# Patient Record
Sex: MALE | Race: BLACK OR AFRICAN AMERICAN | Employment: FULL TIME | URBAN - METROPOLITAN AREA
[De-identification: names, ages, dates, MRNs, and addresses within clinical notes are randomized per-mention and may not be internally consistent; named-entity substitution may affect disease eponyms.]

---

## 2020-07-01 ENCOUNTER — HOSPITAL ENCOUNTER (OUTPATIENT)
Dept: ULTRASOUND IMAGING | Age: 55
Discharge: HOME | End: 2020-07-01
Attending: INTERNAL MEDICINE
Payer: COMMERCIAL

## 2020-07-01 DIAGNOSIS — R79.89 OTHER SPECIFIED ABNORMAL FINDINGS OF BLOOD CHEMISTRY: ICD-10-CM

## 2020-07-01 PROCEDURE — 76770 US EXAM ABDO BACK WALL COMP: CPT

## 2020-12-15 ENCOUNTER — TRANSCRIBE ORDER (OUTPATIENT)
Dept: SCHEDULING | Age: 55
End: 2020-12-15

## 2020-12-15 DIAGNOSIS — N18.9 CHRONIC KIDNEY DISEASE, UNSPECIFIED: Primary | ICD-10-CM

## 2024-03-19 ENCOUNTER — OFFICE VISIT (OUTPATIENT)
Age: 59
End: 2024-03-19
Payer: COMMERCIAL

## 2024-03-19 VITALS
HEIGHT: 72 IN | OXYGEN SATURATION: 97 % | SYSTOLIC BLOOD PRESSURE: 116 MMHG | DIASTOLIC BLOOD PRESSURE: 70 MMHG | HEART RATE: 43 BPM | BODY MASS INDEX: 26.63 KG/M2 | WEIGHT: 196.6 LBS

## 2024-03-19 DIAGNOSIS — I50.20 HFREF (HEART FAILURE WITH REDUCED EJECTION FRACTION) (HCC): ICD-10-CM

## 2024-03-19 DIAGNOSIS — I42.8 NICM (NONISCHEMIC CARDIOMYOPATHY) (HCC): Primary | ICD-10-CM

## 2024-03-19 DIAGNOSIS — R00.1 CHRONIC SINUS BRADYCARDIA: ICD-10-CM

## 2024-03-19 PROCEDURE — 93000 ELECTROCARDIOGRAM COMPLETE: CPT | Performed by: NURSE PRACTITIONER

## 2024-03-19 PROCEDURE — 99204 OFFICE O/P NEW MOD 45 MIN: CPT | Performed by: NURSE PRACTITIONER

## 2024-03-19 RX ORDER — SACUBITRIL AND VALSARTAN 97; 103 MG/1; MG/1
1 TABLET, FILM COATED ORAL 2 TIMES DAILY
COMMUNITY
Start: 2024-02-21

## 2024-03-19 RX ORDER — ALLOPURINOL 300 MG/1
1 TABLET ORAL DAILY
COMMUNITY
Start: 2023-06-20

## 2024-03-19 RX ORDER — ATENOLOL 25 MG/1
25 TABLET ORAL DAILY
COMMUNITY
End: 2024-03-19

## 2024-03-19 RX ORDER — SPIRONOLACTONE 50 MG/1
50 TABLET, FILM COATED ORAL DAILY
COMMUNITY
Start: 2024-02-07

## 2024-03-19 RX ORDER — AMLODIPINE BESYLATE 2.5 MG/1
2.5 TABLET ORAL DAILY
COMMUNITY
Start: 2024-02-08

## 2024-03-19 RX ORDER — ATORVASTATIN CALCIUM 20 MG/1
1 TABLET, FILM COATED ORAL DAILY
COMMUNITY
Start: 2023-06-20

## 2024-03-19 ASSESSMENT — VISUAL ACUITY: OU: 1

## 2024-03-19 NOTE — PROGRESS NOTES
Subjective:   Jeferson Rosenthal III is a 58 y.o.  male with a past medical history including nonischemic cardiomyopathy, HFrEF (unknown last EF), hypertension, hyperlipidemia, CKDIIIa, and chronic alcohol use who presents today to reestablish care with cardiology.    Patient is a poor historian.  He believes he was diagnosed with a heart condition about 10 years ago but does not seem to know much about the underlying cause.  I can see that he had a heart catheterization back in 2016 that showed no coronary disease with a LVEF of 40%.  He historically is followed up with cardiology at [facility] and chart reviewed indicates that he had recovery of his LVEF documented in December 2022.  He has a history of pretty significant sinus bradycardia that has been deemed to be benign.    From a symptom standpoint, he seems to be doing quite well.  He states that he goes to the gym every day and walks a lot for his occupation without ever having any limiting symptoms.  He states that he occasionally feels a little short of breath and sometimes a little bit dizzy, but the symptoms seem to be fleeting, nonprogressive, and not to the point where it is hindering his daily functioning.    He denies any recent or remote history of syncope/presyncope.    His social history is remarkable for about 35-pack-year smoking, but he tells me that he quit in June 2023.  He consumes anywhere from 24 to 72 ounces of beer daily (less on the weekdays and more on the weekends).    He denies any heart palpitations, chest pain/pressure/tightness with exertion, functional limitations, concerns for side effects of his medical regimen.    Primary Care Physician: Hi Cook MD    Tobacco use: Roughly 35 pack years.  Quit smoking in June 2023  Alcohol use: Consumes 2-3 beers daily, but admits to higher quantities on the weekend  Illicit drug use: None  Occupation: Works at [employer], does security  Pertinent

## 2024-03-26 ENCOUNTER — HOSPITAL ENCOUNTER (OUTPATIENT)
Facility: HOSPITAL | Age: 59
Discharge: HOME | End: 2024-03-28
Payer: COMMERCIAL

## 2024-03-26 DIAGNOSIS — I50.20 HFREF (HEART FAILURE WITH REDUCED EJECTION FRACTION) (HCC): ICD-10-CM

## 2024-03-26 DIAGNOSIS — I42.8 NICM (NONISCHEMIC CARDIOMYOPATHY) (HCC): ICD-10-CM

## 2024-03-26 LAB
ECHO AO ROOT DIAM: 3.1 CM
ECHO AV AREA PLAN: 2.5 CM2
ECHO EST RA PRESSURE: 5 MMHG
ECHO LA DIAMETER: 3.7 CM
ECHO LA TO AORTIC ROOT RATIO: 1.19
ECHO LA VOL A-L A4C: 52 ML (ref 18–58)
ECHO LA VOL MOD A4C: 52 ML (ref 18–58)
ECHO LV EDV A4C: 137 ML
ECHO LV EJECTION FRACTION A4C: 47 %
ECHO LV ESV A4C: 72 ML
ECHO LV FRACTIONAL SHORTENING: 27 % (ref 28–44)
ECHO LV INTERNAL DIMENSION DIASTOLIC: 5.9 CM (ref 4.2–5.9)
ECHO LV INTERNAL DIMENSION SYSTOLIC: 4.3 CM
ECHO LV IVSD: 1.2 CM (ref 0.6–1)
ECHO LV MASS 2D: 271.9 G (ref 88–224)
ECHO LV POSTERIOR WALL DIASTOLIC: 1 CM (ref 0.6–1)
ECHO LV RELATIVE WALL THICKNESS RATIO: 0.34
ECHO LVOT AREA: 4.2 CM2
ECHO LVOT DIAM: 2.3 CM
ECHO RIGHT VENTRICULAR SYSTOLIC PRESSURE (RVSP): 35 MMHG
ECHO TV REGURGITANT MAX VELOCITY: 2.72 M/S
ECHO TV REGURGITANT PEAK GRADIENT: 30 MMHG

## 2024-03-26 PROCEDURE — 93308 TTE F-UP OR LMTD: CPT

## 2024-03-27 ENCOUNTER — TELEPHONE (OUTPATIENT)
Age: 59
End: 2024-03-27

## 2024-03-27 NOTE — TELEPHONE ENCOUNTER
LVM for patient discussing details of normal echocardiogram.  Continue exact same medical regimen as he has been and plan on annual follow-ups unless he has any new concerns.  Advised to call our office back for further discussion if he would like.

## 2024-10-14 ENCOUNTER — ANESTHESIA EVENT (OUTPATIENT)
Facility: HOSPITAL | Age: 59
End: 2024-10-14
Payer: COMMERCIAL

## 2024-10-15 ENCOUNTER — ANESTHESIA (OUTPATIENT)
Facility: HOSPITAL | Age: 59
End: 2024-10-15
Payer: COMMERCIAL

## 2024-10-15 ENCOUNTER — HOSPITAL ENCOUNTER (OUTPATIENT)
Facility: HOSPITAL | Age: 59
Setting detail: OUTPATIENT SURGERY
Discharge: HOME | End: 2024-10-15
Attending: INTERNAL MEDICINE | Admitting: INTERNAL MEDICINE
Payer: COMMERCIAL

## 2024-10-15 VITALS
HEART RATE: 55 BPM | HEIGHT: 72 IN | TEMPERATURE: 98 F | BODY MASS INDEX: 25.73 KG/M2 | DIASTOLIC BLOOD PRESSURE: 91 MMHG | OXYGEN SATURATION: 100 % | WEIGHT: 190 LBS | SYSTOLIC BLOOD PRESSURE: 141 MMHG | RESPIRATION RATE: 17 BRPM

## 2024-10-15 PROCEDURE — 7100000011 HC PHASE II RECOVERY - ADDTL 15 MIN: Performed by: INTERNAL MEDICINE

## 2024-10-15 PROCEDURE — 7100000000 HC PACU RECOVERY - FIRST 15 MIN: Performed by: INTERNAL MEDICINE

## 2024-10-15 PROCEDURE — 7100000010 HC PHASE II RECOVERY - FIRST 15 MIN: Performed by: INTERNAL MEDICINE

## 2024-10-15 PROCEDURE — 3600000012 HC SURGERY LEVEL 2 ADDTL 15MIN: Performed by: INTERNAL MEDICINE

## 2024-10-15 PROCEDURE — 3600000002 HC SURGERY LEVEL 2 BASE: Performed by: INTERNAL MEDICINE

## 2024-10-15 PROCEDURE — 2580000003 HC RX 258: Performed by: ANESTHESIOLOGY

## 2024-10-15 PROCEDURE — 3700000001 HC ADD 15 MINUTES (ANESTHESIA): Performed by: INTERNAL MEDICINE

## 2024-10-15 PROCEDURE — 6360000002 HC RX W HCPCS: Performed by: ANESTHESIOLOGY

## 2024-10-15 PROCEDURE — 3700000000 HC ANESTHESIA ATTENDED CARE: Performed by: INTERNAL MEDICINE

## 2024-10-15 PROCEDURE — 2709999900 HC NON-CHARGEABLE SUPPLY: Performed by: INTERNAL MEDICINE

## 2024-10-15 PROCEDURE — 2500000003 HC RX 250 WO HCPCS: Performed by: ANESTHESIOLOGY

## 2024-10-15 RX ORDER — GLYCOPYRROLATE 0.2 MG/ML
INJECTION INTRAMUSCULAR; INTRAVENOUS
Status: DISCONTINUED | OUTPATIENT
Start: 2024-10-15 | End: 2024-10-15 | Stop reason: SDUPTHER

## 2024-10-15 RX ORDER — HYDRALAZINE HYDROCHLORIDE 20 MG/ML
10 INJECTION INTRAMUSCULAR; INTRAVENOUS
Status: DISCONTINUED | OUTPATIENT
Start: 2024-10-15 | End: 2024-10-15 | Stop reason: HOSPADM

## 2024-10-15 RX ORDER — SODIUM CHLORIDE 0.9 % (FLUSH) 0.9 %
5-40 SYRINGE (ML) INJECTION PRN
Status: DISCONTINUED | OUTPATIENT
Start: 2024-10-15 | End: 2024-10-15 | Stop reason: HOSPADM

## 2024-10-15 RX ORDER — SODIUM CHLORIDE 0.9 % (FLUSH) 0.9 %
5-40 SYRINGE (ML) INJECTION EVERY 12 HOURS SCHEDULED
Status: DISCONTINUED | OUTPATIENT
Start: 2024-10-15 | End: 2024-10-15 | Stop reason: HOSPADM

## 2024-10-15 RX ORDER — SODIUM CHLORIDE 9 MG/ML
INJECTION, SOLUTION INTRAVENOUS PRN
Status: DISCONTINUED | OUTPATIENT
Start: 2024-10-15 | End: 2024-10-15 | Stop reason: HOSPADM

## 2024-10-15 RX ORDER — EPHEDRINE SULFATE/0.9% NACL/PF 50 MG/5 ML
SYRINGE (ML) INTRAVENOUS
Status: DISCONTINUED | OUTPATIENT
Start: 2024-10-15 | End: 2024-10-15 | Stop reason: SDUPTHER

## 2024-10-15 RX ORDER — SODIUM CHLORIDE, SODIUM LACTATE, POTASSIUM CHLORIDE, CALCIUM CHLORIDE 600; 310; 30; 20 MG/100ML; MG/100ML; MG/100ML; MG/100ML
INJECTION, SOLUTION INTRAVENOUS CONTINUOUS
Status: DISCONTINUED | OUTPATIENT
Start: 2024-10-15 | End: 2024-10-15 | Stop reason: HOSPADM

## 2024-10-15 RX ORDER — FENTANYL CITRATE 50 UG/ML
25 INJECTION, SOLUTION INTRAMUSCULAR; INTRAVENOUS EVERY 5 MIN PRN
Status: DISCONTINUED | OUTPATIENT
Start: 2024-10-15 | End: 2024-10-15 | Stop reason: HOSPADM

## 2024-10-15 RX ORDER — LIDOCAINE HYDROCHLORIDE 20 MG/ML
INJECTION, SOLUTION EPIDURAL; INFILTRATION; INTRACAUDAL; PERINEURAL
Status: DISCONTINUED | OUTPATIENT
Start: 2024-10-15 | End: 2024-10-15 | Stop reason: SDUPTHER

## 2024-10-15 RX ORDER — ONDANSETRON 2 MG/ML
4 INJECTION INTRAMUSCULAR; INTRAVENOUS
Status: DISCONTINUED | OUTPATIENT
Start: 2024-10-15 | End: 2024-10-15 | Stop reason: HOSPADM

## 2024-10-15 RX ADMIN — PROPOFOL 25 MG: 10 INJECTION, EMULSION INTRAVENOUS at 11:29

## 2024-10-15 RX ADMIN — PROPOFOL 25 MG: 10 INJECTION, EMULSION INTRAVENOUS at 11:18

## 2024-10-15 RX ADMIN — PROPOFOL 25 MG: 10 INJECTION, EMULSION INTRAVENOUS at 11:30

## 2024-10-15 RX ADMIN — SODIUM CHLORIDE, PRESERVATIVE FREE 80 ML: 5 INJECTION INTRAVENOUS at 11:39

## 2024-10-15 RX ADMIN — PROPOFOL 100 MG: 10 INJECTION, EMULSION INTRAVENOUS at 11:12

## 2024-10-15 RX ADMIN — GLYCOPYRROLATE 0.2 MG: 0.2 INJECTION INTRAMUSCULAR; INTRAVENOUS at 11:14

## 2024-10-15 RX ADMIN — LIDOCAINE HYDROCHLORIDE 60 MG: 20 INJECTION, SOLUTION EPIDURAL; INFILTRATION; INTRACAUDAL; PERINEURAL at 11:12

## 2024-10-15 RX ADMIN — Medication 10 MG: at 11:21

## 2024-10-15 RX ADMIN — PROPOFOL 50 MG: 10 INJECTION, EMULSION INTRAVENOUS at 11:24

## 2024-10-15 ASSESSMENT — PAIN - FUNCTIONAL ASSESSMENT
PAIN_FUNCTIONAL_ASSESSMENT: ADULT NONVERBAL PAIN SCALE (NPVS)
PAIN_FUNCTIONAL_ASSESSMENT: 0-10
PAIN_FUNCTIONAL_ASSESSMENT: 0-10
PAIN_FUNCTIONAL_ASSESSMENT: ADULT NONVERBAL PAIN SCALE (NPVS)
PAIN_FUNCTIONAL_ASSESSMENT: 0-10
PAIN_FUNCTIONAL_ASSESSMENT: NONE - DENIES PAIN
PAIN_FUNCTIONAL_ASSESSMENT: 0-10

## 2024-10-15 NOTE — H&P
G I Procedure Note           Endoscopy History and Physical           Dr. Lukas Macario      [place] Office                Jeferson Rosenthal III [id number]  [id number]    1965  58 y.o.  male      Date of Procedure:   Preoperative Diagnosis:       Procedure:   [unfilled]      Screen for colon cancer [Z12.11]                         Procedure(s):  COLONOSCOPY      Gastroenterologist:  Anesthesia:           Lukas Macario MD                               Monitor Anesthesia Care            History and procedure indication:  Jfeerson Rosenthal III is a 58 y.o. Black /  male who presents with: Screen for colon cancer [Z12.11]   including the additional history of Screening ,Screening for colon cancer,,        Past Medical History:   Diagnosis Date    Chronic alcohol use     Chronic sinus bradycardia     HFrEF (heart failure with reduced ejection fraction) (HCC)     Hyperlipidemia     NICM (nonischemic cardiomyopathy) (HCC)       Prior to Admission medications    Medication Sig Start Date End Date Taking? Authorizing Provider   amLODIPine (NORVASC) 2.5 MG tablet Take 1 tablet by mouth daily 24   Lian Goodwin MD   atorvastatin (LIPITOR) 20 MG tablet Take 1 tablet by mouth daily 23   Lian Goodwin MD   sacubitril-valsartan (ENTRESTO)  MG per tablet Take 1 tablet by mouth 2 times daily 24   Lian Goodwin MD   spironolactone (ALDACTONE) 50 MG tablet Take 1 tablet by mouth daily 24   Lian Goodwin MD   allopurinol (ZYLOPRIM) 300 MG tablet Take 1 tablet by mouth daily 23   Lian Goodwin MD     No Known Allergies    No past surgical history on file.  No family history on file.   Social History     Tobacco Use    Smoking status: Former     Types: Cigarettes     Start date: 2023     Quit date: 3/1/1988     Years since quittin.6    Smokeless tobacco: Never   Substance Use

## 2024-10-15 NOTE — ANESTHESIA POSTPROCEDURE EVALUATION
Post-Anesthesia Evaluation and Assessment    Patient: Jeferson Rosenthal III MRN: [id number]  SSN: [id number]    YOB: 1965  Age: 58 y.o.  Sex: male      I have evaluated the patient and they are stable and ready for discharge from the PACU.     Cardiovascular Function/Vital Signs  Visit Vitals  /86   Pulse 66   Temp 97.7 °F (36.5 °C) (Temporal)   Resp 16   Ht 1.829 m (6')   Wt 86.2 kg (190 lb)   SpO2 97%   BMI 25.77 kg/m²       Patient is status post Monitor Anesthesia Care anesthesia for Procedure(s):  COLONOSCOPY.    Nausea/Vomiting: None    Postoperative hydration reviewed and adequate.    Pain:      Managed    Neurological Status:       At baseline    Mental Status, Level of Consciousness: Alert and  oriented to person, place, and time    Pulmonary Status:       Adequate oxygenation and airway patent    Complications related to anesthesia: None    Post-anesthesia assessment completed. No concerns    Signed By: Ashley Lloyd MD     October 15, 2024            Department of Anesthesiology  Postprocedure Note    Patient: Jeferson Rosenthal III  MRN: [id number]  YOB: 1965  Date of evaluation: 10/15/2024    Procedure Summary       Date: 10/15/24 Room / Location: [facility] MAIN OR R4 / [facility] MAIN OR    Anesthesia Start: 1107 Anesthesia Stop: 1139    Procedure: COLONOSCOPY (Lower GI Region) Diagnosis:       Screen for colon cancer      (Screen for colon cancer [Z12.11])    Surgeons: Lukas Macario MD Responsible Provider: Ashley Lloyd MD    Anesthesia Type: MAC ASA Status: 3            Anesthesia Type: No value filed.    Earnestine Phase I: Earnestine Score: 6    Earnestine Phase II:      Anesthesia Post Evaluation    No notable events documented.

## 2024-10-15 NOTE — PERIOP NOTE
Jeferson Rosenthal III  1965  [id number]    Situation:  Verbal report given from: Helen Shaw RN  Procedure: Procedure(s):  COLONOSCOPY    Background:    Preoperative diagnosis: Screen for colon cancer [Z12.11]    Postoperative diagnosis: * No post-op diagnosis entered *    :  Dr. Macario    Assistant(s): Circulator: Helen Shaw, RN  Scrub Person First: Beka Alexander RN  Circulator Assist: Ally Beckman RN    Specimens: * No specimens in log *    Assessment:  Intra-procedure medications         Anesthesia gave intra-procedure sedation and medications, see anesthesia flow sheet     Intravenous fluids: LR@ KVO     Vital signs stable

## 2024-10-15 NOTE — DISCHARGE INSTRUCTIONS
Endoscopy Discharge Instructions     Dr. Lukas Macario     [place] office                                            NAME: Jeferson Rosenthal III RECORD NUMBER:[id number]    AGE:  58 y.o. YOB: 1965                                                              FINAL Discharge Procedure and Diagnosis:       Procedure(s):  COLONOSCOPY       FINDINGS:     hemorrhoids                                        MEDICATIONS    [x] CONTINUE CURRENT MEDICATIONS     [] NEW MEDICATIONS           1.    2.    3.         Testing   Schedule              Colonoscopy Screening                                   Recommendations       []  Repeat colonoscopy in 6-12 month         2nd to Inadequate  prep    []  Repeat colonoscopy in 3 years    []  Repeat colonoscopy in 5 years    [x]  Repeat colonoscopy in 10 years         New additional  Tests  Call the office   [phone] for the appointment time      []      []      []                                     YOUR NEXT APPOINTMENT WITH DR MACARIO:                                                                                                                                [x]   None follow up with pcp   []  1 week       []   2 week    []  1 month    Always keep KEEP  APPOINTMENT WITH  @PCP@ for regular medical follow up                                                                                                                         If you had a colonoscopy the \"C\" indicates specific instructions        x                                           Diet Instructions :   Ordinarily you may resume your previous diet but your initial diet should be       Light your discharge nurse will go over this with you.  Large meals can cause  abdominal discomfort after these procedures.                                                                           Specific Diet Recommendations:        [x] High fiber diet.

## 2024-10-15 NOTE — OP NOTE
G I Procedure Note            COLONOSCOPY   Dr. Lukas Macario   [place] office     Jeferson Rosenthal III                                   [id number]                                  [id number]   1965                                      58 y.o.                                    male      Procedure Date: @date@   [x]  Anesthesia MAC                                                                                                Pre Op Diagnosis:    Indications:                   1. Screen for colon cancer [Z12.11]                                                                                                                                                                          Post Op Diagnosis:                    1.  Grade one internal hemorrhoids                                                                               H&p completed: Yes      Anesthesia Assessment: Performed prior to procedure:      No change  Anesthesia Plan: Performed prior to procedure:                   No change       Medications: See Reviewed List and Reconcilation           Informed consent was obtained     Risk Statement:  Prior to the procedure the risks were explained to the patient and/or to the family including but not limited to perforation, bleeding, adverse drug reaction, aspiration, and even the need for possible surgery.  A colonoscopy exam is not 100% accurate which may be related to preparation or blind spots during the exam.The possibility that an abnormality and /or cancer could be missed was also discussed as well as alternative x-ray options.         Instrument:    Olympus adult Videocolonoscope                                   Immediate Procedure Reassessment Completed     With the patient in the left lateral position, a rectal examination was performed and the findings were: negative without mass, lesions or tenderness   The Olympus

## 2024-10-15 NOTE — ANESTHESIA PRE PROCEDURE
Department of Anesthesiology  Preprocedure Note       Name:  Jeferson Rosenthal III   Age:  58 y.o.  :  1965                                          MRN:  [id number]         Date:  10/15/2024      Surgeon: Surgeon(s):  Lukas Macario MD    Procedure: Procedure(s):  COLONOSCOPY    Medications prior to admission:   Prior to Admission medications    Medication Sig Start Date End Date Taking? Authorizing Provider   amLODIPine (NORVASC) 2.5 MG tablet Take 1 tablet by mouth daily 24  Yes Provider, MD Lian   atorvastatin (LIPITOR) 20 MG tablet Take 1 tablet by mouth daily 23  Yes Provider, MD Lian   sacubitril-valsartan (ENTRESTO)  MG per tablet Take 1 tablet by mouth 2 times daily 24  Yes ProviderLian MD   spironolactone (ALDACTONE) 50 MG tablet Take 1 tablet by mouth daily 24  Yes Lian Goodwin MD   allopurinol (ZYLOPRIM) 300 MG tablet Take 1 tablet by mouth daily 23  Yes Provider, MD Lian       Current medications:    Current Facility-Administered Medications   Medication Dose Route Frequency Provider Last Rate Last Admin   • sodium chloride flush 0.9 % injection 5-40 mL  5-40 mL IntraVENous 2 times per day Ashley Lloyd MD       • sodium chloride flush 0.9 % injection 5-40 mL  5-40 mL IntraVENous PRN Ashley Lloyd MD       • 0.9 % sodium chloride infusion   IntraVENous PRN Ashley Lloyd MD       • fentaNYL (SUBLIMAZE) injection 25 mcg  25 mcg IntraVENous Q5 Min PRN Ashley Lloyd MD       • ondansetron (ZOFRAN) injection 4 mg  4 mg IntraVENous Once PRN Ashley Lloyd MD       • hydrALAZINE (APRESOLINE) injection 10 mg  10 mg IntraVENous Q15 Min PRN Ashley Lloyd MD       • lactated ringers IV soln infusion   IntraVENous Continuous Ashley Lloyd MD       • sodium chloride flush 0.9 % injection 5-40 mL  5-40 mL IntraVENous 2 times per day Ashley Lloyd MD       • sodium chloride flush 0.9 % injection 5-40 mL  5-40 mL

## 2025-02-27 ENCOUNTER — TELEPHONE (OUTPATIENT)
Age: 60
End: 2025-02-27

## 2025-02-27 RX ORDER — VALSARTAN 320 MG/1
320 TABLET ORAL DAILY
Qty: 30 TABLET | Refills: 3 | Status: SHIPPED | OUTPATIENT
Start: 2025-02-27

## 2025-02-27 NOTE — TELEPHONE ENCOUNTER
Per Call center-Pcp has theory that entresto is possibly causing allergic response and she doesn't want to stop it but she has some recommendations on how to manage the reaction      [phone] best contact for the doctor     And patient number is [phone]      Message was routed to Caio Padron NP.    Caio spoke with patient and discontinued his Entresto as he states it could be Angioedema.  Pt started on Valsartan 320 mg 1 by mouth daily (RX sent to pharmacy)  Spoke with Dr. Hi Cook to relay this message.  She will add the medication to her patient chart.

## 2025-02-27 NOTE — TELEPHONE ENCOUNTER
Pcp has theory that entresto is possibly causing allergic response and she doesn't want to stop it but she has some recommendations on how to manage the reaction     [phone] best contact for the doctor    And patient number is [phone]

## 2025-02-27 NOTE — PROGRESS NOTES
Received call from patient's primary care physician regarding concerns of Entresto possibly inducing intermittent angioedema.  Patient has apparently had unprovoked painless mild lip swelling frequently as of recently.  He is on  of Entresto.  No history of possible angioedema before being on this medication.    We will stop the Entresto right away and start valsartan monotherapy to see if this improves the symptoms.  If not, he will need to come off of ARB altogether.  We have outpatient follow-up in just a few weeks so we can address the topic more at that time.    He does not have any additional allergic reaction like symptoms.    He has never had any severe facial swelling, tongue swelling, difficulty swallowing, or problems with his breathing.  He was advised to call 911 if any of the symptoms occur.

## 2025-03-18 ENCOUNTER — OFFICE VISIT (OUTPATIENT)
Age: 60
End: 2025-03-18
Payer: COMMERCIAL

## 2025-03-18 VITALS
HEIGHT: 72 IN | BODY MASS INDEX: 28.09 KG/M2 | DIASTOLIC BLOOD PRESSURE: 68 MMHG | SYSTOLIC BLOOD PRESSURE: 111 MMHG | HEART RATE: 43 BPM | OXYGEN SATURATION: 99 % | WEIGHT: 207.4 LBS

## 2025-03-18 DIAGNOSIS — R00.1 CHRONIC SINUS BRADYCARDIA: ICD-10-CM

## 2025-03-18 DIAGNOSIS — I42.8 NICM (NONISCHEMIC CARDIOMYOPATHY) (HCC): Primary | ICD-10-CM

## 2025-03-18 DIAGNOSIS — I50.20 HFREF (HEART FAILURE WITH REDUCED EJECTION FRACTION) (HCC): ICD-10-CM

## 2025-03-18 DIAGNOSIS — I95.1 ORTHOSTATIC HYPOTENSION: ICD-10-CM

## 2025-03-18 DIAGNOSIS — N18.31 CKD STAGE 3A, GFR 45-59 ML/MIN (HCC): ICD-10-CM

## 2025-03-18 PROCEDURE — 93000 ELECTROCARDIOGRAM COMPLETE: CPT | Performed by: NURSE PRACTITIONER

## 2025-03-18 PROCEDURE — 99214 OFFICE O/P EST MOD 30 MIN: CPT | Performed by: NURSE PRACTITIONER

## 2025-03-18 RX ORDER — EPINEPHRINE 0.15 MG/.3ML
INJECTION INTRAMUSCULAR
COMMUNITY
Start: 2025-01-09

## 2025-03-18 ASSESSMENT — VISUAL ACUITY: OU: 1

## 2025-03-18 NOTE — PROGRESS NOTES
1. Have you been to the ER, urgent care clinic since your last visit?  Hospitalized since your last visit?No    2. Have you seen or consulted any other health care providers outside of the [facility] since your last visit?  Include any pap smears or colon screening. No

## 2025-03-18 NOTE — PATIENT INSTRUCTIONS
Please stop taking the amlodipine.  This medication could be making your blood pressure little bit low which can make you feel dizzy.    We will do a 1 week monitor of heart.  I would like you to keep a record on the monitor with the device we give you if you feel lightheaded/dizzy, short of breath, or weak and I can look at your heart monitor during those episodes to see if there are any problems.    Try to have 2 or less beers per day.

## 2025-03-18 NOTE — PROGRESS NOTES
Subjective:   Jeferson Rosenthal III is a 59 y.o.  male with a past medical history including nonischemic cardiomyopathy, HFrEF (unknown stacey EF), hypertension, hyperlipidemia, CKDIIIa, ? developmental delay  and chronic alcohol use who presents today for annual follow-up visit.    Mr. Rosenthal presents today with a well-controlled blood pressure and stable weight.  I previously have a conversation with him about some of his intermittent lip swelling that was detected by his primary care provider we decided it would be best to stop his Entresto for concerns of possible angioedema.  He was transition to valsartan and has been tolerating that well.  He is still having intermittent episodes of poorly articulated upper lip swelling and occasionally a sensation of left-sided facial swelling.  He showed me a picture of his face during this episode and he did not have any appreciable lip swelling consistent with angioedema.  He denies any tongue swelling or issues with occultly breathing.  He states that this has been happening \"for a long time\".      His only other concern seem to be intermittent episodes of some mild lightheadedness/dizziness and mild dyspnea.  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  He states that sometimes he feels like he needs to grab onto things to keep himself balanced.  The symptoms usually go away relatively quickly.  He has never syncopized and denies any chest discomfort or diaphoresis during the episodes.    Orthostatics completed in the office today do show a drop of systolic blood pressure from 143-109 with standing without any significant changes in heart rate.  He did not feel dizzy during this episode    In the office today he has ongoing chronic bradycardia.  He previously had workup with this with a brief Holter monitor that did not show any significant issues per his prior cardiologist interpretation.  I cannot review the raw data of

## 2025-03-19 ENCOUNTER — TELEPHONE (OUTPATIENT)
Age: 60
End: 2025-03-19

## 2025-03-19 NOTE — TELEPHONE ENCOUNTER
Spoke with pt after 2 ID.    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    Message to reschedule sent to Jennifer VALENCIA.

## 2025-03-24 ENCOUNTER — HOSPITAL ENCOUNTER (OUTPATIENT)
Facility: HOSPITAL | Age: 60
Discharge: HOME | End: 2025-03-26
Payer: COMMERCIAL

## 2025-03-24 DIAGNOSIS — I42.8 NICM (NONISCHEMIC CARDIOMYOPATHY) (HCC): ICD-10-CM

## 2025-03-24 DIAGNOSIS — I50.20 HFREF (HEART FAILURE WITH REDUCED EJECTION FRACTION) (HCC): ICD-10-CM

## 2025-03-24 PROCEDURE — 93270 REMOTE 30 DAY ECG REV/REPORT: CPT

## 2025-04-02 ENCOUNTER — TELEPHONE (OUTPATIENT)
Age: 60
End: 2025-04-02

## 2025-04-02 NOTE — TELEPHONE ENCOUNTER
Per Preeti Rivero RN in Stress lab    \" I received an urgent alert from [employer] for Mr. Jeferson Rosenthalestela had ordered a monitor for him. His HR is 33. I notified Dr. carrasquillo and uploaded the strip into his chart and he said to notify you and have you call the patient. \"    Spoke with pt after 2 identifiers who stated he is feeling fine,he only feels lightheaded for a few seconds after working out in the mornings.  No c/o dizziness,chest pain,sob or feeling any different than usual.  Reminded if he starts to have any s/s ie chest pain,sob or feeling like he is going to pass out to go to ER.    Pt verbalized understanding with no questions.  
Slow heart rate was sinus harsh, 2 am so he was probably sleeping. Continue to monitor for symptoms and wear monitor until time period is over. thanks  
[FreeTextEntry1] : continues to have ear pain\par TM red b/l\par will start augmentin\par continue with drops\par recommend starting allegra D\par continue to clear sinuses \par RTO 1 week\par \par

## 2025-04-16 ENCOUNTER — RESULTS FOLLOW-UP (OUTPATIENT)
Age: 60
End: 2025-04-16

## 2025-04-16 NOTE — TELEPHONE ENCOUNTER
Left voice message regarding results of recent event monitor and requested callback for further discussion.    He has chronic sinus bradycardia and had episodes in the upper 30s to low 40s during his monitoring.  He has been having some poorly articulated episodes of dizziness but was also found to be orthostatic during his recent follow-up visit, so I reduced his antihypertensive regimen.    I have a lower suspicion for sick sinus syndrome/chronotropic incompetence, but if his dizziness persists we will need to look into this further.

## 2025-04-28 ENCOUNTER — HOSPITAL ENCOUNTER (EMERGENCY)
Facility: HOSPITAL | Age: 60
Discharge: ANOTHER HOSPITAL | End: 2025-04-28
Attending: STUDENT IN AN ORGANIZED HEALTH CARE EDUCATION/TRAINING PROGRAM
Payer: COMMERCIAL

## 2025-04-28 ENCOUNTER — APPOINTMENT (OUTPATIENT)
Facility: HOSPITAL | Age: 60
End: 2025-04-28
Payer: COMMERCIAL

## 2025-04-28 ENCOUNTER — HOSPITAL ENCOUNTER (INPATIENT)
Facility: HOSPITAL | Age: 60
LOS: 2 days | Discharge: HOME | DRG: 281 | End: 2025-04-30
Attending: FAMILY MEDICINE | Admitting: FAMILY MEDICINE
Payer: COMMERCIAL

## 2025-04-28 VITALS
RESPIRATION RATE: 12 BRPM | WEIGHT: 203 LBS | HEIGHT: 73 IN | SYSTOLIC BLOOD PRESSURE: 193 MMHG | OXYGEN SATURATION: 99 % | DIASTOLIC BLOOD PRESSURE: 97 MMHG | BODY MASS INDEX: 26.9 KG/M2 | HEART RATE: 42 BPM | TEMPERATURE: 97.3 F

## 2025-04-28 DIAGNOSIS — I10 HYPERTENSION, UNSPECIFIED TYPE: ICD-10-CM

## 2025-04-28 DIAGNOSIS — Z86.79 HX OF CARDIOMYOPATHY: ICD-10-CM

## 2025-04-28 DIAGNOSIS — I24.9 ACUTE CORONARY SYNDROME (HCC): Primary | ICD-10-CM

## 2025-04-28 DIAGNOSIS — I21.4 NSTEMI (NON-ST ELEVATED MYOCARDIAL INFARCTION) (HCC): Primary | ICD-10-CM

## 2025-04-28 PROBLEM — I42.8 NONISCHEMIC CARDIOMYOPATHY (HCC): Status: ACTIVE | Noted: 2022-02-24

## 2025-04-28 PROBLEM — N40.0 BENIGN PROSTATIC HYPERPLASIA WITHOUT URINARY OBSTRUCTION: Status: ACTIVE | Noted: 2017-11-29

## 2025-04-28 PROBLEM — Z98.890 HISTORY OF CARDIAC CATHETERIZATION: Status: ACTIVE | Noted: 2021-12-30

## 2025-04-28 PROBLEM — N18.31 STAGE 3A CHRONIC KIDNEY DISEASE (HCC): Status: ACTIVE | Noted: 2022-01-02

## 2025-04-28 LAB
ALBUMIN SERPL-MCNC: 3.8 G/DL (ref 3.5–5)
ALBUMIN/GLOB SERPL: 1 (ref 1.1–2.2)
ALP SERPL-CCNC: 62 U/L (ref 45–117)
ALT SERPL-CCNC: 18 U/L (ref 12–78)
ANION GAP SERPL CALC-SCNC: 5 MMOL/L (ref 2–12)
ANION GAP SERPL CALC-SCNC: 8 MMOL/L (ref 2–12)
APTT PPP: 23.5 SEC (ref 22.1–31)
APTT PPP: 27.3 SEC (ref 22.1–31)
AST SERPL-CCNC: 26 U/L (ref 15–37)
BASOPHILS # BLD: 0.02 K/UL (ref 0–0.1)
BASOPHILS NFR BLD: 0.4 % (ref 0–1)
BILIRUB SERPL-MCNC: 1 MG/DL (ref 0.2–1)
BUN SERPL-MCNC: 18 MG/DL (ref 6–20)
BUN SERPL-MCNC: 18 MG/DL (ref 6–20)
BUN/CREAT SERPL: 10 (ref 12–20)
BUN/CREAT SERPL: 10 (ref 12–20)
CALCIUM SERPL-MCNC: 9.1 MG/DL (ref 8.5–10.1)
CALCIUM SERPL-MCNC: 9.6 MG/DL (ref 8.5–10.1)
CHLORIDE SERPL-SCNC: 105 MMOL/L (ref 97–108)
CHLORIDE SERPL-SCNC: 110 MMOL/L (ref 97–108)
CO2 SERPL-SCNC: 18 MMOL/L (ref 21–32)
CO2 SERPL-SCNC: 27 MMOL/L (ref 21–32)
CREAT SERPL-MCNC: 1.73 MG/DL (ref 0.7–1.3)
CREAT SERPL-MCNC: 1.75 MG/DL (ref 0.7–1.3)
D DIMER PPP FEU-MCNC: 0.4 MG/L FEU (ref 0–0.65)
DIFFERENTIAL METHOD BLD: ABNORMAL
EOSINOPHIL # BLD: 0.14 K/UL (ref 0–0.4)
EOSINOPHIL NFR BLD: 2.5 % (ref 0–7)
ERYTHROCYTE [DISTWIDTH] IN BLOOD BY AUTOMATED COUNT: 11.7 % (ref 11.5–14.5)
ERYTHROCYTE [DISTWIDTH] IN BLOOD BY AUTOMATED COUNT: 11.9 % (ref 11.5–14.5)
GLOBULIN SER CALC-MCNC: 4 G/DL (ref 2–4)
GLUCOSE SERPL-MCNC: 104 MG/DL (ref 65–100)
GLUCOSE SERPL-MCNC: 78 MG/DL (ref 65–100)
HCT VFR BLD AUTO: 32.4 % (ref 36.6–50.3)
HCT VFR BLD AUTO: 33.3 % (ref 36.6–50.3)
HGB BLD-MCNC: 11.2 G/DL (ref 12.1–17)
HGB BLD-MCNC: 11.6 G/DL (ref 12.1–17)
IMM GRANULOCYTES # BLD AUTO: 0.01 K/UL (ref 0–0.04)
IMM GRANULOCYTES NFR BLD AUTO: 0.2 % (ref 0–0.5)
INR PPP: 1 (ref 0.9–1.1)
INR PPP: 1.1 (ref 0.9–1.1)
LYMPHOCYTES # BLD: 1.39 K/UL (ref 0.8–3.5)
LYMPHOCYTES NFR BLD: 24.6 % (ref 12–49)
MCH RBC QN AUTO: 29.4 PG (ref 26–34)
MCH RBC QN AUTO: 29.8 PG (ref 26–34)
MCHC RBC AUTO-ENTMCNC: 34.6 G/DL (ref 30–36.5)
MCHC RBC AUTO-ENTMCNC: 34.8 G/DL (ref 30–36.5)
MCV RBC AUTO: 84.5 FL (ref 80–99)
MCV RBC AUTO: 86.2 FL (ref 80–99)
MONOCYTES # BLD: 0.56 K/UL (ref 0–1)
MONOCYTES NFR BLD: 9.9 % (ref 5–13)
NEUTS SEG # BLD: 3.52 K/UL (ref 1.8–8)
NEUTS SEG NFR BLD: 62.4 % (ref 32–75)
NRBC # BLD: 0 K/UL (ref 0–0.01)
NRBC # BLD: 0 K/UL (ref 0–0.01)
NRBC BLD-RTO: 0 PER 100 WBC
NRBC BLD-RTO: 0 PER 100 WBC
NT PRO BNP: 236 PG/ML (ref 0–125)
PLATELET # BLD AUTO: 152 K/UL (ref 150–400)
PLATELET # BLD AUTO: 165 K/UL (ref 150–400)
PMV BLD AUTO: 10.1 FL (ref 8.9–12.9)
PMV BLD AUTO: 11.2 FL (ref 8.9–12.9)
POTASSIUM SERPL-SCNC: 4 MMOL/L (ref 3.5–5.1)
POTASSIUM SERPL-SCNC: 4.5 MMOL/L (ref 3.5–5.1)
PROT SERPL-MCNC: 7.8 G/DL (ref 6.4–8.2)
PROTHROMBIN TIME: 10.6 SEC (ref 9.2–11.2)
PROTHROMBIN TIME: 11.3 SEC (ref 9.2–11.2)
RBC # BLD AUTO: 3.76 M/UL (ref 4.1–5.7)
RBC # BLD AUTO: 3.94 M/UL (ref 4.1–5.7)
SODIUM SERPL-SCNC: 136 MMOL/L (ref 136–145)
SODIUM SERPL-SCNC: 137 MMOL/L (ref 136–145)
THERAPEUTIC RANGE: NORMAL SECS (ref 58–77)
THERAPEUTIC RANGE: NORMAL SECS (ref 58–77)
TROPONIN I SERPL HS-MCNC: 708 NG/L (ref 0–76)
TROPONIN I SERPL HS-MCNC: 719 NG/L (ref 0–76)
TROPONIN I SERPL HS-MCNC: 757 NG/L (ref 0–76)
TROPONIN I SERPL HS-MCNC: 838 NG/L (ref 0–76)
UFH PPP CHRO-ACNC: 0.15 IU/ML
UFH PPP CHRO-ACNC: <0.1 IU/ML
WBC # BLD AUTO: 4.5 K/UL (ref 4.1–11.1)
WBC # BLD AUTO: 5.6 K/UL (ref 4.1–11.1)

## 2025-04-28 PROCEDURE — 2060000000 HC ICU INTERMEDIATE R&B

## 2025-04-28 PROCEDURE — 84484 ASSAY OF TROPONIN QUANT: CPT

## 2025-04-28 PROCEDURE — 36415 COLL VENOUS BLD VENIPUNCTURE: CPT

## 2025-04-28 PROCEDURE — 96374 THER/PROPH/DIAG INJ IV PUSH: CPT

## 2025-04-28 PROCEDURE — 85610 PROTHROMBIN TIME: CPT

## 2025-04-28 PROCEDURE — 6370000000 HC RX 637 (ALT 250 FOR IP): Performed by: STUDENT IN AN ORGANIZED HEALTH CARE EDUCATION/TRAINING PROGRAM

## 2025-04-28 PROCEDURE — 85025 COMPLETE CBC W/AUTO DIFF WBC: CPT

## 2025-04-28 PROCEDURE — 71045 X-RAY EXAM CHEST 1 VIEW: CPT

## 2025-04-28 PROCEDURE — 85379 FIBRIN DEGRADATION QUANT: CPT

## 2025-04-28 PROCEDURE — 80053 COMPREHEN METABOLIC PANEL: CPT

## 2025-04-28 PROCEDURE — 6370000000 HC RX 637 (ALT 250 FOR IP): Performed by: FAMILY MEDICINE

## 2025-04-28 PROCEDURE — 85520 HEPARIN ASSAY: CPT

## 2025-04-28 PROCEDURE — 85730 THROMBOPLASTIN TIME PARTIAL: CPT

## 2025-04-28 PROCEDURE — 80048 BASIC METABOLIC PNL TOTAL CA: CPT

## 2025-04-28 PROCEDURE — 93005 ELECTROCARDIOGRAM TRACING: CPT | Performed by: STUDENT IN AN ORGANIZED HEALTH CARE EDUCATION/TRAINING PROGRAM

## 2025-04-28 PROCEDURE — 85027 COMPLETE CBC AUTOMATED: CPT

## 2025-04-28 PROCEDURE — 2500000003 HC RX 250 WO HCPCS: Performed by: FAMILY MEDICINE

## 2025-04-28 PROCEDURE — 99285 EMERGENCY DEPT VISIT HI MDM: CPT

## 2025-04-28 PROCEDURE — 83880 ASSAY OF NATRIURETIC PEPTIDE: CPT

## 2025-04-28 PROCEDURE — 6360000002 HC RX W HCPCS: Performed by: FAMILY MEDICINE

## 2025-04-28 PROCEDURE — 6360000002 HC RX W HCPCS: Performed by: STUDENT IN AN ORGANIZED HEALTH CARE EDUCATION/TRAINING PROGRAM

## 2025-04-28 RX ORDER — METOPROLOL TARTRATE 25 MG/1
25 TABLET, FILM COATED ORAL 2 TIMES DAILY
Status: DISCONTINUED | OUTPATIENT
Start: 2025-04-29 | End: 2025-04-29

## 2025-04-28 RX ORDER — ASPIRIN 81 MG/1
81 TABLET, CHEWABLE ORAL DAILY
Status: DISCONTINUED | OUTPATIENT
Start: 2025-04-29 | End: 2025-05-01 | Stop reason: HOSPADM

## 2025-04-28 RX ORDER — HEPARIN SODIUM 1000 [USP'U]/ML
60 INJECTION, SOLUTION INTRAVENOUS; SUBCUTANEOUS ONCE
Status: DISCONTINUED | OUTPATIENT
Start: 2025-04-28 | End: 2025-04-28 | Stop reason: ALTCHOICE

## 2025-04-28 RX ORDER — ASPIRIN 81 MG/1
324 TABLET, CHEWABLE ORAL ONCE
Status: COMPLETED | OUTPATIENT
Start: 2025-04-28 | End: 2025-04-28

## 2025-04-28 RX ORDER — MAGNESIUM SULFATE IN WATER 40 MG/ML
2000 INJECTION, SOLUTION INTRAVENOUS PRN
Status: DISCONTINUED | OUTPATIENT
Start: 2025-04-28 | End: 2025-05-01 | Stop reason: HOSPADM

## 2025-04-28 RX ORDER — HEPARIN SODIUM 10000 [USP'U]/100ML
5-30 INJECTION, SOLUTION INTRAVENOUS CONTINUOUS
Status: DISCONTINUED | OUTPATIENT
Start: 2025-04-28 | End: 2025-04-28 | Stop reason: HOSPADM

## 2025-04-28 RX ORDER — SODIUM CHLORIDE 9 MG/ML
INJECTION, SOLUTION INTRAVENOUS PRN
Status: DISCONTINUED | OUTPATIENT
Start: 2025-04-28 | End: 2025-04-29

## 2025-04-28 RX ORDER — HEPARIN SODIUM 1000 [USP'U]/ML
2000 INJECTION, SOLUTION INTRAVENOUS; SUBCUTANEOUS PRN
Status: DISCONTINUED | OUTPATIENT
Start: 2025-04-28 | End: 2025-05-01 | Stop reason: HOSPADM

## 2025-04-28 RX ORDER — HYDROXYZINE HYDROCHLORIDE 25 MG/1
25 TABLET, FILM COATED ORAL 2 TIMES DAILY
COMMUNITY

## 2025-04-28 RX ORDER — VALSARTAN 320 MG/1
320 TABLET ORAL DAILY
Status: DISCONTINUED | OUTPATIENT
Start: 2025-04-28 | End: 2025-04-28

## 2025-04-28 RX ORDER — POTASSIUM CHLORIDE 750 MG/1
40 TABLET, EXTENDED RELEASE ORAL PRN
Status: DISCONTINUED | OUTPATIENT
Start: 2025-04-28 | End: 2025-05-01 | Stop reason: HOSPADM

## 2025-04-28 RX ORDER — HEPARIN SODIUM 1000 [USP'U]/ML
2000 INJECTION, SOLUTION INTRAVENOUS; SUBCUTANEOUS PRN
Status: DISCONTINUED | OUTPATIENT
Start: 2025-04-28 | End: 2025-04-28 | Stop reason: HOSPADM

## 2025-04-28 RX ORDER — HEPARIN SODIUM 1000 [USP'U]/ML
4000 INJECTION, SOLUTION INTRAVENOUS; SUBCUTANEOUS PRN
Status: DISCONTINUED | OUTPATIENT
Start: 2025-04-28 | End: 2025-05-01 | Stop reason: HOSPADM

## 2025-04-28 RX ORDER — SPIRONOLACTONE 25 MG/1
50 TABLET ORAL DAILY
Status: DISCONTINUED | OUTPATIENT
Start: 2025-04-29 | End: 2025-05-01 | Stop reason: HOSPADM

## 2025-04-28 RX ORDER — AMLODIPINE BESYLATE 2.5 MG/1
2.5 TABLET ORAL DAILY
Status: ON HOLD | COMMUNITY
End: 2025-04-28

## 2025-04-28 RX ORDER — POLYETHYLENE GLYCOL 3350 17 G/17G
17 POWDER, FOR SOLUTION ORAL DAILY PRN
Status: DISCONTINUED | OUTPATIENT
Start: 2025-04-28 | End: 2025-05-01 | Stop reason: HOSPADM

## 2025-04-28 RX ORDER — VALSARTAN 160 MG/1
320 TABLET ORAL DAILY
Status: DISCONTINUED | OUTPATIENT
Start: 2025-04-28 | End: 2025-04-30

## 2025-04-28 RX ORDER — ACETAMINOPHEN 650 MG/1
650 SUPPOSITORY RECTAL EVERY 6 HOURS PRN
Status: DISCONTINUED | OUTPATIENT
Start: 2025-04-28 | End: 2025-05-01 | Stop reason: HOSPADM

## 2025-04-28 RX ORDER — HEPARIN SODIUM 1000 [USP'U]/ML
4000 INJECTION, SOLUTION INTRAVENOUS; SUBCUTANEOUS PRN
Status: DISCONTINUED | OUTPATIENT
Start: 2025-04-28 | End: 2025-04-28 | Stop reason: HOSPADM

## 2025-04-28 RX ORDER — ACETAMINOPHEN 325 MG/1
650 TABLET ORAL EVERY 6 HOURS PRN
Status: DISCONTINUED | OUTPATIENT
Start: 2025-04-28 | End: 2025-05-01 | Stop reason: HOSPADM

## 2025-04-28 RX ORDER — SODIUM CHLORIDE 0.9 % (FLUSH) 0.9 %
5-40 SYRINGE (ML) INJECTION EVERY 12 HOURS SCHEDULED
Status: DISCONTINUED | OUTPATIENT
Start: 2025-04-28 | End: 2025-04-29

## 2025-04-28 RX ORDER — HEPARIN SODIUM 1000 [USP'U]/ML
4000 INJECTION, SOLUTION INTRAVENOUS; SUBCUTANEOUS ONCE
Status: COMPLETED | OUTPATIENT
Start: 2025-04-28 | End: 2025-04-28

## 2025-04-28 RX ORDER — POTASSIUM CHLORIDE 7.45 MG/ML
10 INJECTION INTRAVENOUS PRN
Status: DISCONTINUED | OUTPATIENT
Start: 2025-04-28 | End: 2025-05-01 | Stop reason: HOSPADM

## 2025-04-28 RX ORDER — ONDANSETRON 2 MG/ML
4 INJECTION INTRAMUSCULAR; INTRAVENOUS EVERY 6 HOURS PRN
Status: DISCONTINUED | OUTPATIENT
Start: 2025-04-28 | End: 2025-05-01 | Stop reason: HOSPADM

## 2025-04-28 RX ORDER — SODIUM CHLORIDE 0.9 % (FLUSH) 0.9 %
5-40 SYRINGE (ML) INJECTION PRN
Status: DISCONTINUED | OUTPATIENT
Start: 2025-04-28 | End: 2025-04-29

## 2025-04-28 RX ORDER — ATORVASTATIN CALCIUM 20 MG/1
20 TABLET, FILM COATED ORAL DAILY
Status: DISCONTINUED | OUTPATIENT
Start: 2025-04-28 | End: 2025-04-28

## 2025-04-28 RX ORDER — HEPARIN SODIUM 10000 [USP'U]/100ML
5-30 INJECTION, SOLUTION INTRAVENOUS CONTINUOUS
Status: DISCONTINUED | OUTPATIENT
Start: 2025-04-28 | End: 2025-05-01 | Stop reason: HOSPADM

## 2025-04-28 RX ORDER — ATORVASTATIN CALCIUM 40 MG/1
80 TABLET, FILM COATED ORAL NIGHTLY
Status: DISCONTINUED | OUTPATIENT
Start: 2025-04-28 | End: 2025-05-01 | Stop reason: HOSPADM

## 2025-04-28 RX ORDER — ONDANSETRON 4 MG/1
4 TABLET, ORALLY DISINTEGRATING ORAL EVERY 8 HOURS PRN
Status: DISCONTINUED | OUTPATIENT
Start: 2025-04-28 | End: 2025-05-01 | Stop reason: HOSPADM

## 2025-04-28 RX ADMIN — ASPIRIN 324 MG: 81 TABLET, CHEWABLE ORAL at 14:45

## 2025-04-28 RX ADMIN — HEPARIN SODIUM 4000 UNITS: 1000 INJECTION INTRAVENOUS; SUBCUTANEOUS at 14:42

## 2025-04-28 RX ADMIN — NITROGLYCERIN 1 INCH: 20 OINTMENT TOPICAL at 16:33

## 2025-04-28 RX ADMIN — HEPARIN SODIUM 10 UNITS/KG/HR: 10000 INJECTION, SOLUTION INTRAVENOUS at 18:48

## 2025-04-28 RX ADMIN — ATORVASTATIN CALCIUM 80 MG: 40 TABLET, FILM COATED ORAL at 21:58

## 2025-04-28 RX ADMIN — SODIUM CHLORIDE, PRESERVATIVE FREE 10 ML: 5 INJECTION INTRAVENOUS at 22:00

## 2025-04-28 ASSESSMENT — PAIN SCALES - GENERAL
PAINLEVEL_OUTOF10: 0
PAINLEVEL_OUTOF10: 4

## 2025-04-28 ASSESSMENT — PAIN DESCRIPTION - ORIENTATION: ORIENTATION: LEFT

## 2025-04-28 ASSESSMENT — PAIN DESCRIPTION - LOCATION: LOCATION: CHEST

## 2025-04-28 ASSESSMENT — PAIN DESCRIPTION - DESCRIPTORS: DESCRIPTORS: SHARP

## 2025-04-28 ASSESSMENT — PAIN - FUNCTIONAL ASSESSMENT: PAIN_FUNCTIONAL_ASSESSMENT: 0-10

## 2025-04-28 NOTE — H&P
History and Physical    Date of Service:  4/28/2025  Primary Care Provider: Hi Cook MD  Source of information: The patient    Chief Complaint: No chief complaint on file.      History of Presenting Illness:   Jeferson Rosenthal III is a 59 y.o. male PMH NICM, ETOH use, CKD3a, HTN, hyperlipids, HFrEF who presents from [facility] after being sent there from work where he was working and developed worsening dizziness and feeling of disorientation and being unwell. No overt chest pains, although he states he had been having sharp chest pains yesterday, so this weighed on his mind. He has known h/o heart issues, but not a heart blockage in past. States he had heart cath in past which was without any need for stent. He drinks 2-3 beers at most in a day per him and states he has gone a few days at a time without drinking in past year without any signs or symptoms of withdrawal. He quit smoking about 3 years ago he tells me. He states he works at [employer] as a  and it is standard procedure to walk the halls including up and down stairs and he frequently can do this without getting winded and even if he does get a little dizzy he can walk it off fine, this time was very different feeling and situation.       REVIEW OF SYSTEMS:  Respiratory: negative for pleurisy/chest pain, shortness of breath, and wheezing  Cardiovascular: positive for fatigue and near-syncope, negative for chest pain, chest pressure/discomfort, irregular heart beat, and lower extremity edema  Gastrointestinal: negative     Past Medical History:   Diagnosis Date    Chronic alcohol use     Chronic sinus bradycardia     CKD stage 3a, GFR 45-59 ml/min (HCC)     HFrEF (heart failure with reduced ejection fraction) (HCC)     Hyperlipidemia     Hypertension     NICM (nonischemic cardiomyopathy) ([facility])       Past Surgical History:   Procedure Laterality Date    COLONOSCOPY N/A 10/15/2024    COLONOSCOPY performed by Lukas Macario,  MD at [facility] MAIN OR     Prior to Admission medications    Medication Sig Start Date End Date Taking? Authorizing Provider   EPINEPHrine (EPIPEN JR) 0.15 MG/0.3ML SOAJ TAKE 2 AUTOS AS NEEDED BY INJECTION ROUTE AS DIRECTED, FOR SEVERE ALLERGIC REACTION. 1/9/25   Lian Goodwin MD   valsartan (DIOVAN) 320 MG tablet Take 1 tablet by mouth daily 2/27/25   Caio Padron APRN - NP   atorvastatin (LIPITOR) 20 MG tablet Take 1 tablet by mouth daily 6/20/23   Lian Goodwin MD   spironolactone (ALDACTONE) 50 MG tablet Take 1 tablet by mouth daily 2/7/24   Lian Goodwin MD   allopurinol (ZYLOPRIM) 300 MG tablet Take 1 tablet by mouth daily 6/20/23   Lian Goodwin MD     Allergies   Allergen Reactions    Sacubitril-Valsartan Other (See Comments)     Lip swelling       No family history on file.   Social History:  reports that he quit smoking about 37 years ago. His smoking use included cigarettes. He started smoking about 22 months ago. He has never used smokeless tobacco. He reports current alcohol use of about 6.0 standard drinks of alcohol per week. He reports that he does not use drugs.   Social Drivers of Health     Tobacco Use: Medium Risk (3/18/2025)    Patient History     Smoking Tobacco Use: Former     Smokeless Tobacco Use: Never     Passive Exposure: Not on file   Alcohol Use: Alcohol Misuse (7/19/2023)    Received from [facility]    AUDIT-C     Frequency of Alcohol Consumption: 4 or more times a week     Average Number of Drinks: 3 or 4     Frequency of Binge Drinking: Never   Financial Resource Strain: Not on file   Food Insecurity: Not on file   Transportation Needs: Not on file   Physical Activity: Not on file   Stress: Not on file   Social Connections: Not on file   Intimate Partner Violence: Not on file   Depression: Not at risk (12/1/2022)    Received from [facility]    PHQ-2     PHQ-9 Total Score: 0

## 2025-04-28 NOTE — ED NOTES
TRANSFER - OUT REPORT:    Verbal report given to TORIBIO Maher on Jeferson D Quick III  being transferred to [facility] for urgent transfer       Report consisted of patient's Situation, Background, Assessment and   Recommendations(SBAR).     Information from the following report(s) Nurse Handoff Report, ED Encounter Summary, ED SBAR, Intake/Output, MAR, and Recent Results was reviewed with the receiving nurse.    [place] Fall Assessment:    Presents to emergency department  because of falls (Syncope, seizure, or loss of consciousness): No  Age > 70: No  Altered Mental Status, Intoxication with alcohol or substance confusion (Disorientation, impaired judgment, poor safety awaremess, or inability to follow instructions): No  Impaired Mobility: Ambulates or transfers with assistive devices or assistance; Unable to ambulate or transer.: No  Nursing Judgement: No          Lines:   Peripheral IV 04/28/25 Right Antecubital (Active)       Peripheral IV 04/28/25 Left;Proximal;Anterior Forearm (Active)        Opportunity for questions and clarification was provided.      Patient transported with:  Monitor

## 2025-04-28 NOTE — ED PROVIDER NOTES
damage, cardiac arrest, death    EMERGENCY DEPARTMENT COURSE and DIFFERENTIAL DIAGNOSIS/MDM   Vitals:    Vitals:    04/28/25 1223   BP: (!) 151/86   Pulse: (!) 42   Resp: 18   Temp: 97.3 °F (36.3 °C)   TempSrc: Oral   SpO2: 100%   Weight: 92.1 kg (203 lb)   Height: 1.854 m (6' 1\")     Patient was given the following medications:  Medications   heparin (porcine) injection 4,000 Units (4,000 Units IntraVENous Given 4/28/25 1442)   aspirin chewable tablet 324 mg (324 mg Oral Given 4/28/25 1445)   nitroglycerin (NITRO-BID) 2 % ointment 1 inch (1 inch Topical Given 4/28/25 1633)     Medical Decision Making  Patient presented to the ED with exertional, radiating chest pain with associated shortness of breath over the past few days.  Troponin elevated.  Suspicious for ACS even without active chest pain.  Low risk Wells and d-dimer negative - doubt PE.  No neurovascular deficits or elevated d-dimer to suggest aortic dissection - did not feel CTA necessary especially with no widened mediastinum and normal d-dimer.  Possible that troponin elevated from HTN in the ED - this progressively worsened during ED stay and patient was treated with topical nitroglycerin.  ACS was treated with PO ASA and IV heparin gtt.  Patient was transferred to [facility] for further management.  Discussed case with treating cardiology provider, Caio Padron, who agreed with ischemic evaluation.    Amount and/or Complexity of Data Reviewed  Labs: ordered. Decision-making details documented in ED Course.  Radiology: ordered and independent interpretation performed.     Details: CXR as interpreted by me with no airspace disease, no pneumothorax, no widened mediastinum  ECG/medicine tests: ordered and independent interpretation performed. Decision-making details documented in ED Course.  Discussion of management or test interpretation with external provider(s): See ED course    Risk  OTC drugs.  Prescription drug management.  Decision regarding

## 2025-04-28 NOTE — PROGRESS NOTES
Spiritual Care Partner Volunteer attempted to visit patient at [facility] in [facility] EMERGENCY DEPARTMENT on 4/28/2025. Patient was asleep.   Documented by:  [facility] [employer]  To contact the  call: [phone])

## 2025-04-28 NOTE — ED NOTES
Pt presents to ED complaining of sharp left sided chest pain that radiates to his left upper ribs x 3 days. Pt endorses hx of bradycardia and HF and reports he sees Caio Padron. Pt reports the last time he saw his cardiologist was about a month ago. Pt reports SOB upon exertion. Pt states he also workout his chest, back, and arms which is his normal routine and then the pain started. Pt is alert and oriented x 4, RR even and unlabored, skin is warm and dry. Pt appears in NAD at this time. Assessment completed and pt updated on plan of care.  Call bell in reach.    Emergency Department Nursing Plan of Care  The Nursing Plan of Care is developed from the Nursing assessment and Emergency Department Attending provider initial evaluation.  The plan of care may be reviewed in the “ED Provider note”.      The Plan of Care was developed with the following considerations:  Patient / Family readiness to learn indicated by:[facility]er to [facility]  Persons(s) to be included in education: Refer to Medical chart in [facility]  Barriers to Learning/Limitations:Normal     Signed    Lena Blancas RN  4/28/2025 12:41 PM

## 2025-04-28 NOTE — ED TRIAGE NOTES
Pt presents with sharp, left, pectoral pain that radiates to his left upper ribs x 3 days. Pt states he works out his chest, back, and arms, and that he was doing his normal routine when the pain came on, and endorses shortness of breath with exertion. Pt states that bradycardia is normal for him.

## 2025-04-28 NOTE — PROGRESS NOTES
1736- Patient oriented to room and plan of care. Hep gtt continued at rate from [facility] per lab. Labs drawn and sent. Call bell in reach

## 2025-04-29 ENCOUNTER — APPOINTMENT (OUTPATIENT)
Facility: HOSPITAL | Age: 60
DRG: 281 | End: 2025-04-29
Attending: FAMILY MEDICINE
Payer: COMMERCIAL

## 2025-04-29 LAB
ANION GAP SERPL CALC-SCNC: 4 MMOL/L (ref 2–12)
BUN SERPL-MCNC: 22 MG/DL (ref 6–20)
BUN/CREAT SERPL: 11 (ref 12–20)
CALCIUM SERPL-MCNC: 8.9 MG/DL (ref 8.5–10.1)
CHLORIDE SERPL-SCNC: 111 MMOL/L (ref 97–108)
CHOLEST SERPL-MCNC: 118 MG/DL
CO2 SERPL-SCNC: 24 MMOL/L (ref 21–32)
CREAT SERPL-MCNC: 1.92 MG/DL (ref 0.7–1.3)
ECHO AV PEAK GRADIENT: 10 MMHG
ECHO AV PEAK VELOCITY: 1.6 M/S
ECHO BSA: 2.15 M2
ECHO EST RA PRESSURE: 3 MMHG
ECHO LA DIAMETER INDEX: 1.58 CM/M2
ECHO LA DIAMETER: 3.4 CM
ECHO LV E' SEPTAL VELOCITY: 3.81 CM/S
ECHO LV EF PHYSICIAN: 55 %
ECHO LV FRACTIONAL SHORTENING: 22 % (ref 28–44)
ECHO LV INTERNAL DIMENSION DIASTOLE INDEX: 2.56 CM/M2
ECHO LV INTERNAL DIMENSION DIASTOLIC: 5.5 CM (ref 4.2–5.9)
ECHO LV INTERNAL DIMENSION SYSTOLIC INDEX: 2 CM/M2
ECHO LV INTERNAL DIMENSION SYSTOLIC: 4.3 CM
ECHO LV IVSD: 1.1 CM (ref 0.6–1)
ECHO LV MASS 2D: 242 G (ref 88–224)
ECHO LV MASS INDEX 2D: 112.6 G/M2 (ref 49–115)
ECHO LV POSTERIOR WALL DIASTOLIC: 1.1 CM (ref 0.6–1)
ECHO LV RELATIVE WALL THICKNESS RATIO: 0.4
ECHO LVOT AREA: 4.2 CM2
ECHO LVOT DIAM: 2.3 CM
ECHO MV A VELOCITY: 0.7 M/S
ECHO MV E VELOCITY: 0.38 M/S
ECHO MV E/A RATIO: 0.54
ECHO MV E/E' SEPTAL: 9.97
ECHO RIGHT VENTRICULAR SYSTOLIC PRESSURE (RVSP): 38 MMHG
ECHO RV TAPSE: 2.1 CM (ref 1.7–?)
ECHO TV REGURGITANT MAX VELOCITY: 2.97 M/S
ECHO TV REGURGITANT PEAK GRADIENT: 35 MMHG
EKG ATRIAL RATE: 46 BPM
EKG DIAGNOSIS: NORMAL
EKG P AXIS: 28 DEGREES
EKG P-R INTERVAL: 140 MS
EKG Q-T INTERVAL: 484 MS
EKG QRS DURATION: 94 MS
EKG QTC CALCULATION (BAZETT): 423 MS
EKG R AXIS: -26 DEGREES
EKG T AXIS: 95 DEGREES
EKG VENTRICULAR RATE: 46 BPM
ERYTHROCYTE [DISTWIDTH] IN BLOOD BY AUTOMATED COUNT: 11.9 % (ref 11.5–14.5)
EST. AVERAGE GLUCOSE BLD GHB EST-MCNC: 100 MG/DL
GLUCOSE SERPL-MCNC: 99 MG/DL (ref 65–100)
HBA1C MFR BLD: 5.1 % (ref 4–5.6)
HCT VFR BLD AUTO: 30.4 % (ref 36.6–50.3)
HDLC SERPL-MCNC: 50 MG/DL
HDLC SERPL: 2.4 (ref 0–5)
HGB BLD-MCNC: 10.4 G/DL (ref 12.1–17)
LDLC SERPL CALC-MCNC: 43 MG/DL (ref 0–100)
MCH RBC QN AUTO: 29.1 PG (ref 26–34)
MCHC RBC AUTO-ENTMCNC: 34.2 G/DL (ref 30–36.5)
MCV RBC AUTO: 84.9 FL (ref 80–99)
NRBC # BLD: 0 K/UL (ref 0–0.01)
NRBC BLD-RTO: 0 PER 100 WBC
PLATELET # BLD AUTO: 144 K/UL (ref 150–400)
PMV BLD AUTO: 10.3 FL (ref 8.9–12.9)
POTASSIUM SERPL-SCNC: 3.8 MMOL/L (ref 3.5–5.1)
RBC # BLD AUTO: 3.58 M/UL (ref 4.1–5.7)
SODIUM SERPL-SCNC: 139 MMOL/L (ref 136–145)
TRIGL SERPL-MCNC: 125 MG/DL
UFH PPP CHRO-ACNC: 0.3 IU/ML
UFH PPP CHRO-ACNC: 0.3 IU/ML
VLDLC SERPL CALC-MCNC: 25 MG/DL
WBC # BLD AUTO: 4.3 K/UL (ref 4.1–11.1)

## 2025-04-29 PROCEDURE — 85027 COMPLETE CBC AUTOMATED: CPT

## 2025-04-29 PROCEDURE — 2060000000 HC ICU INTERMEDIATE R&B

## 2025-04-29 PROCEDURE — 83036 HEMOGLOBIN GLYCOSYLATED A1C: CPT

## 2025-04-29 PROCEDURE — 85520 HEPARIN ASSAY: CPT

## 2025-04-29 PROCEDURE — 6360000002 HC RX W HCPCS: Performed by: FAMILY MEDICINE

## 2025-04-29 PROCEDURE — 2580000003 HC RX 258: Performed by: SPECIALIST

## 2025-04-29 PROCEDURE — 93010 ELECTROCARDIOGRAM REPORT: CPT | Performed by: SPECIALIST

## 2025-04-29 PROCEDURE — 93306 TTE W/DOPPLER COMPLETE: CPT | Performed by: SPECIALIST

## 2025-04-29 PROCEDURE — 80061 LIPID PANEL: CPT

## 2025-04-29 PROCEDURE — C8929 TTE W OR WO FOL WCON,DOPPLER: HCPCS

## 2025-04-29 PROCEDURE — 99223 1ST HOSP IP/OBS HIGH 75: CPT | Performed by: SPECIALIST

## 2025-04-29 PROCEDURE — 2500000003 HC RX 250 WO HCPCS: Performed by: FAMILY MEDICINE

## 2025-04-29 PROCEDURE — 80048 BASIC METABOLIC PNL TOTAL CA: CPT

## 2025-04-29 PROCEDURE — 6370000000 HC RX 637 (ALT 250 FOR IP): Performed by: FAMILY MEDICINE

## 2025-04-29 PROCEDURE — 6360000002 HC RX W HCPCS: Performed by: NURSE PRACTITIONER

## 2025-04-29 RX ORDER — SODIUM CHLORIDE 9 MG/ML
INJECTION, SOLUTION INTRAVENOUS CONTINUOUS
Status: DISCONTINUED | OUTPATIENT
Start: 2025-04-29 | End: 2025-04-30

## 2025-04-29 RX ORDER — HYDRALAZINE HYDROCHLORIDE 20 MG/ML
10 INJECTION INTRAMUSCULAR; INTRAVENOUS ONCE
Status: COMPLETED | OUTPATIENT
Start: 2025-04-29 | End: 2025-04-29

## 2025-04-29 RX ADMIN — HYDRALAZINE HYDROCHLORIDE 10 MG: 20 INJECTION INTRAMUSCULAR; INTRAVENOUS at 23:36

## 2025-04-29 RX ADMIN — SODIUM CHLORIDE: 0.9 INJECTION, SOLUTION INTRAVENOUS at 23:21

## 2025-04-29 RX ADMIN — SPIRONOLACTONE 50 MG: 25 TABLET ORAL at 09:11

## 2025-04-29 RX ADMIN — ASPIRIN 81 MG CHEWABLE TABLET 81 MG: 81 TABLET CHEWABLE at 09:11

## 2025-04-29 RX ADMIN — ATORVASTATIN CALCIUM 80 MG: 40 TABLET, FILM COATED ORAL at 23:00

## 2025-04-29 RX ADMIN — SODIUM CHLORIDE: 0.9 INJECTION, SOLUTION INTRAVENOUS at 11:21

## 2025-04-29 RX ADMIN — SODIUM CHLORIDE, PRESERVATIVE FREE 10 ML: 5 INJECTION INTRAVENOUS at 09:11

## 2025-04-29 RX ADMIN — VALSARTAN 320 MG: 160 TABLET, FILM COATED ORAL at 07:30

## 2025-04-29 RX ADMIN — HEPARIN SODIUM 10 UNITS/KG/HR: 10000 INJECTION, SOLUTION INTRAVENOUS at 09:36

## 2025-04-29 ASSESSMENT — PAIN SCALES - GENERAL: PAINLEVEL_OUTOF10: 0

## 2025-04-29 NOTE — PLAN OF CARE
Troponin drawn at 1840 resulted at 838, repeat troponin at 2054 decreased to 719. IRIS Blount notified    Problem: Discharge Planning  Goal: Discharge to home or other facility with appropriate resources  Outcome: Progressing

## 2025-04-29 NOTE — CARE COORDINATION
Care Management Initial Assessment       RUR:8%  Readmission? No  1st IM letter given? No  1st  letter given: No     CM met with patient/spouse at bedside to introduce self and role.     ADLs: Independent  DME: None  PCP follow up: Hi Cook  Previous Home Health: None  Previous Skilled Nursing Facility: None  Previous Inpatient Rehab: None  Insurance verified: Hugo  Pharmacy: [facility] [address]  Emergency Contact: Santhosh Ariadna [phone]    CM will follow patient progress and assist as needed with AYLIN plan.      04/29/25 1501   Service Assessment   Patient Orientation Alert and Oriented   Cognition Alert   History Provided By Patient   Primary Caregiver Self   Support Systems Children   PCP Verified by CM Yes   Last Visit to PCP Within last 6 months   Prior Functional Level Independent in ADLs/IADLs   Current Functional Level Independent in ADLs/IADLs   Can patient return to prior living arrangement Yes   Ability to make needs known: Good   Family able to assist with home care needs: Yes   Would you like for me to discuss the discharge plan with any other family members/significant others, and if so, who? No   Financial Resources Other (Comment)  (Hugo)   Social/Functional History   Lives With Alone   Type of Home Apartment   Home Layout One level   Bathroom Shower/Tub Tub/Shower unit   Bathroom Toilet Standard   Home Equipment None   Receives Help From Family;Friend(s)   Prior Level of Assist for ADLs Independent   Prior Level of Assist for Homemaking Independent   Ambulation Assistance Independent   Active  Yes   Mode of Transportation Car   Occupation Full time employment   Discharge Planning   Type of Residence Apartment   Living Arrangements Alone   Current Services Prior To Admission None   Potential Assistance Needed N/A   Potential Assistance Purchasing Medications No   Type of Home Care Services None   Patient expects to be discharged to: Apartment   History of falls? 0   Services At/After

## 2025-04-29 NOTE — PROGRESS NOTES
Hospitalist Progress Note  Ernesto An MD  Answering service: [phone]        Date of Service:  2025  NAME:  Jeferson Rosenthal III  :  1965  MRN:  [id number]      Admission Summary:     Patient admitted with NSTEMI.    Interval history / Subjective:     No chest pain this a.m.     Assessment & Plan:     Suspect NSTEMI  -Patient presented with chest pain and elevated troponins, peaked at 838 and trending down  -EKG shows sinus bradycardia with sinus arrhythmia, voltage criteria for LVH, nonspecific T wave abnormality  -Echo pending, cardiology to further evaluate  -Has been started on heparin on admission, continue aspirin    Hypertension  - Continue metoprolol, Aldactone, Diovan  - Monitor blood pressure    Dyslipidemia  - Continue statin    CKD stage III  - Renal function stable at baseline  - Continue monitor    Alcohol dependence  -Monitor on CIWA protocol     Code status: Full  Prophylaxis: SCDs  Care Plan discussed with: Patient  Anticipated Disposition: 24 to 48 hours  Central Line:   None             Review of Systems:   Pertinent items are noted in HPI.         Vital Signs:    Last 24hrs VS reviewed since prior progress note. Most recent are:  Vitals:    25 0726   BP: (!) 188/98   Pulse: (!) 40   Resp: 20   Temp: 97.6 °F (36.4 °C)   SpO2: 100%         Intake/Output Summary (Last 24 hours) at 2025 0753  Last data filed at 2025 0437  Gross per 24 hour   Intake 306.83 ml   Output --   Net 306.83 ml        Physical Examination:     I had a face to face encounter with this patient and independently examined them on 2025 as outlined below:          General : alert x 3, awake, no acute distress,   HEENT: PEERL, EOMI, moist mucus membrane, TM clear  Neck: supple, no JVD, no meningeal signs  Chest: Clear to auscultation bilaterally   CVS: S1 S2 heard, Capillary refill less than 2  seconds  Abd: soft/ non tender, non distended, BS physiological,   Ext: no clubbing, no cyanosis, no edema, brisk 2+ DP pulses  Neuro/Psych: pleasant mood and affect, CN 2-12 grossly intact, sensory grossly within normal limit, Strength 5/5 in all extremities, DTR 1+ x 4  Skin: warm            Data Review:    Review and/or order of clinical lab test    I have independently reviewed and interpreted patient's lab and all other diagnostic data    Notes reviewed from all clinical/nonclinical/nursing services involved in patient's clinical care. Care coordination discussions were held with appropriate clinical/nonclinical/ nursing providers based on care coordination needs.     Labs:     Recent Labs     04/28/25  1840 04/29/25  0116   WBC 4.5 4.3   HGB 11.2* 10.4*   HCT 32.4* 30.4*    144*     Recent Labs     04/28/25  1234 04/28/25  1840 04/29/25  0116    136 139   K 4.0 4.5 3.8    110* 111*   CO2 27 18* 24   BUN 18 18 22*     Recent Labs     04/28/25  1234   ALT 18   GLOB 4.0     Recent Labs     04/28/25  1234 04/28/25  1840   INR 1.0 1.1   APTT 23.5 27.3      No results for input(s): \"TIBC\" in the last 72 hours.    Invalid input(s): \"FE\", \"PSAT\", \"FERR\"   No results found for: \"RBCF\"   No results for input(s): \"PH\", \"PCO2\", \"PO2\" in the last 72 hours.  No results for input(s): \"CPK\" in the last 72 hours.    Invalid input(s): \"CPKMB\", \"CKNDX\", \"TROIQ\"  Lab Results   Component Value Date/Time    CHOL 118 04/29/2025 01:16 AM    HDL 50 04/29/2025 01:16 AM    LDL 43 04/29/2025 01:16 AM     No results found for: \"GLUCPOC\"  [unfilled]      Medications Reviewed:     Current Facility-Administered Medications   Medication Dose Route Frequency    heparin 25,000 units in dextrose 5% 250 mL (premix) infusion  5-30 Units/kg/hr IntraVENous Continuous    spironolactone (ALDACTONE) tablet 50 mg  50 mg Oral Daily    sodium chloride flush 0.9 % injection 5-40 mL  5-40 mL IntraVENous 2 times per day    sodium chloride

## 2025-04-29 NOTE — PROGRESS NOTES
0730: Bedside shift change report given to TORIBIO Estrada (oncoming nurse) by TORIBIO Worthington (offgoing nurse). Report included the following information Nurse Handoff Report, MAR, and Recent Results.     Heparin drip therapeutic x 2. Next labs morning of 4/30 per protocol.     1115: Patient okay to eat per Rasheeda EDWARDS NP.    1930: Bedside shift change report given to TORIBIO Worthington (oncoming nurse) by TORIBIO Estrada (offgoing nurse). Report included the following information Nurse Handoff Report, MAR, and Recent Results.

## 2025-04-29 NOTE — PROGRESS NOTES
Cardiology full note to follow.  Note elevated troponin and symptomatic with history of cardiomyopathy.  Will plan to hold beta-blocker due to low heart rate going forward.  Hold valsartan Aldactone anticipation of possible cath with elevated creatinine.  Hydrate normal saline at 100  Echocardiogram  Right and left heart cath tomorrow if renal function stable  Please see consult note later today.

## 2025-04-29 NOTE — CONSULTS
[facility] CARDIOLOGY  Cardiology Care Note                  [x]Initial visit     []Established visit     Patient Name: Jeferson Rosenthal III - :1965 - MRN:[id number]  Primary Cardiologist: Dr. Homar Otero  Consulting Cardiologist: Precious Schroeder MD     Reason for initial visit:NSTEMI    HPI:    Mr. Rosenthal is a 59 y.o. male who presents with chief complaint of  feeling woozy, mild disorientation sensation and just \"not feeling well.\" He states that yesterday at work he developed sudden onset of dizziness, lightheadedness and \"just felt unwell.\"  No SOB or diaphoresis .  Felt a little near syncopal.  Had No chest pain although had episode of chest pain day prior which occurred when he got up to go to kitchen but lasted briefly, less than a minute.  Denies any history of syncope.  Reports he works out lifting weights in a.m. and walking a lot at work without FRIAS.  He went to [facility].  In ER, pt was found to have HS troponin trend of 757, 708, 838, 719.  EKG showed sinus bradycardia with nonpecific T wave abnormality also present on EKG on 3/18/25. BP is 179/98 on presentation.  His HR was 45 (sinus bradycardia).  CXR showed clear lungs. BP was 236.  Ddimer 0.4. He was started on ACS IV heparin. Creatinine is 1.92 this a.m (trend up).     Mr. Rosenthal  has PMH of nonischemic cardiomyopathy (EF 40% in , but then normalized in 2022 EF 55-60%.), CKD IIIA, HTN, HLD, chronic sinus bradycardia.    LHC in 216 showed no significant CAD (details unknown), no stents.    SH: former smoker, quit 3-4 years ago.  Drinks 2-3 beers per day.  Walks halls for security job.   FH: mom had MI, Maternal grandmother had MI and uncle had MI. At least on of these were < 70 at time of MI.      SUBJECTIVE:  Denies any chest pain or SOB.  No dizziness currently. Doing bed exercises, moving arms, legs in bed HR mostly stayed in mid 40's, briefly to 50.

## 2025-04-30 ENCOUNTER — TELEPHONE (OUTPATIENT)
Age: 60
End: 2025-04-30

## 2025-04-30 VITALS
HEART RATE: 48 BPM | WEIGHT: 199.3 LBS | RESPIRATION RATE: 18 BRPM | DIASTOLIC BLOOD PRESSURE: 91 MMHG | TEMPERATURE: 98.2 F | HEIGHT: 73 IN | OXYGEN SATURATION: 100 % | SYSTOLIC BLOOD PRESSURE: 130 MMHG | BODY MASS INDEX: 26.41 KG/M2

## 2025-04-30 LAB
ANION GAP SERPL CALC-SCNC: 5 MMOL/L (ref 2–12)
BUN SERPL-MCNC: 18 MG/DL (ref 6–20)
BUN/CREAT SERPL: 12 (ref 12–20)
CALCIUM SERPL-MCNC: 9.1 MG/DL (ref 8.5–10.1)
CHLORIDE SERPL-SCNC: 114 MMOL/L (ref 97–108)
CO2 SERPL-SCNC: 22 MMOL/L (ref 21–32)
CREAT SERPL-MCNC: 1.51 MG/DL (ref 0.7–1.3)
ECHO BSA: 2.15 M2
ERYTHROCYTE [DISTWIDTH] IN BLOOD BY AUTOMATED COUNT: 11.9 % (ref 11.5–14.5)
GLUCOSE SERPL-MCNC: 104 MG/DL (ref 65–100)
HCT VFR BLD AUTO: 31 % (ref 36.6–50.3)
HGB BLD-MCNC: 10.8 G/DL (ref 12.1–17)
MCH RBC QN AUTO: 29.4 PG (ref 26–34)
MCHC RBC AUTO-ENTMCNC: 34.8 G/DL (ref 30–36.5)
MCV RBC AUTO: 84.5 FL (ref 80–99)
NRBC # BLD: 0 K/UL (ref 0–0.01)
NRBC BLD-RTO: 0 PER 100 WBC
PLATELET # BLD AUTO: 148 K/UL (ref 150–400)
PMV BLD AUTO: 10.5 FL (ref 8.9–12.9)
POTASSIUM SERPL-SCNC: 3.8 MMOL/L (ref 3.5–5.1)
RBC # BLD AUTO: 3.67 M/UL (ref 4.1–5.7)
SODIUM SERPL-SCNC: 141 MMOL/L (ref 136–145)
UFH PPP CHRO-ACNC: 0.29 IU/ML
UFH PPP CHRO-ACNC: 0.41 IU/ML
WBC # BLD AUTO: 4.4 K/UL (ref 4.1–11.1)

## 2025-04-30 PROCEDURE — 6360000002 HC RX W HCPCS: Performed by: NURSE PRACTITIONER

## 2025-04-30 PROCEDURE — 6360000002 HC RX W HCPCS: Performed by: INTERNAL MEDICINE

## 2025-04-30 PROCEDURE — C1725 CATH, TRANSLUMIN NON-LASER: HCPCS | Performed by: INTERNAL MEDICINE

## 2025-04-30 PROCEDURE — 4A023N7 MEASUREMENT OF CARDIAC SAMPLING AND PRESSURE, LEFT HEART, PERCUTANEOUS APPROACH: ICD-10-PCS | Performed by: INTERNAL MEDICINE

## 2025-04-30 PROCEDURE — C1713 ANCHOR/SCREW BN/BN,TIS/BN: HCPCS | Performed by: INTERNAL MEDICINE

## 2025-04-30 PROCEDURE — 93458 L HRT ARTERY/VENTRICLE ANGIO: CPT | Performed by: INTERNAL MEDICINE

## 2025-04-30 PROCEDURE — 85520 HEPARIN ASSAY: CPT

## 2025-04-30 PROCEDURE — 99153 MOD SED SAME PHYS/QHP EA: CPT | Performed by: INTERNAL MEDICINE

## 2025-04-30 PROCEDURE — B2111ZZ FLUOROSCOPY OF MULTIPLE CORONARY ARTERIES USING LOW OSMOLAR CONTRAST: ICD-10-PCS | Performed by: INTERNAL MEDICINE

## 2025-04-30 PROCEDURE — 6370000000 HC RX 637 (ALT 250 FOR IP): Performed by: FAMILY MEDICINE

## 2025-04-30 PROCEDURE — 2580000003 HC RX 258: Performed by: INTERNAL MEDICINE

## 2025-04-30 PROCEDURE — C1760 CLOSURE DEV, VASC: HCPCS | Performed by: INTERNAL MEDICINE

## 2025-04-30 PROCEDURE — 99152 MOD SED SAME PHYS/QHP 5/>YRS: CPT | Performed by: INTERNAL MEDICINE

## 2025-04-30 PROCEDURE — 6370000000 HC RX 637 (ALT 250 FOR IP): Performed by: NURSE PRACTITIONER

## 2025-04-30 PROCEDURE — 2709999900 HC NON-CHARGEABLE SUPPLY: Performed by: INTERNAL MEDICINE

## 2025-04-30 PROCEDURE — 85027 COMPLETE CBC AUTOMATED: CPT

## 2025-04-30 PROCEDURE — 99233 SBSQ HOSP IP/OBS HIGH 50: CPT | Performed by: SPECIALIST

## 2025-04-30 PROCEDURE — 80048 BASIC METABOLIC PNL TOTAL CA: CPT

## 2025-04-30 PROCEDURE — 76937 US GUIDE VASCULAR ACCESS: CPT | Performed by: INTERNAL MEDICINE

## 2025-04-30 PROCEDURE — 6360000004 HC RX CONTRAST MEDICATION: Performed by: INTERNAL MEDICINE

## 2025-04-30 PROCEDURE — C1894 INTRO/SHEATH, NON-LASER: HCPCS | Performed by: INTERNAL MEDICINE

## 2025-04-30 PROCEDURE — B2151ZZ FLUOROSCOPY OF LEFT HEART USING LOW OSMOLAR CONTRAST: ICD-10-PCS | Performed by: INTERNAL MEDICINE

## 2025-04-30 PROCEDURE — 6360000002 HC RX W HCPCS: Performed by: FAMILY MEDICINE

## 2025-04-30 RX ORDER — SODIUM CHLORIDE 9 MG/ML
INJECTION, SOLUTION INTRAVENOUS CONTINUOUS
Status: DISCONTINUED | OUTPATIENT
Start: 2025-04-30 | End: 2025-04-30

## 2025-04-30 RX ORDER — ACETAMINOPHEN 325 MG/1
650 TABLET ORAL EVERY 4 HOURS PRN
Status: DISCONTINUED | OUTPATIENT
Start: 2025-04-30 | End: 2025-05-01 | Stop reason: HOSPADM

## 2025-04-30 RX ORDER — IOPAMIDOL 755 MG/ML
INJECTION, SOLUTION INTRAVASCULAR PRN
Status: DISCONTINUED | OUTPATIENT
Start: 2025-04-30 | End: 2025-04-30 | Stop reason: HOSPADM

## 2025-04-30 RX ORDER — SODIUM CHLORIDE 9 MG/ML
INJECTION, SOLUTION INTRAVENOUS CONTINUOUS PRN
Status: COMPLETED | OUTPATIENT
Start: 2025-04-30 | End: 2025-04-30

## 2025-04-30 RX ORDER — SODIUM CHLORIDE 0.9 % (FLUSH) 0.9 %
5-40 SYRINGE (ML) INJECTION PRN
Status: DISCONTINUED | OUTPATIENT
Start: 2025-04-30 | End: 2025-05-01 | Stop reason: HOSPADM

## 2025-04-30 RX ORDER — FENTANYL CITRATE 50 UG/ML
INJECTION, SOLUTION INTRAMUSCULAR; INTRAVENOUS PRN
Status: DISCONTINUED | OUTPATIENT
Start: 2025-04-30 | End: 2025-04-30 | Stop reason: HOSPADM

## 2025-04-30 RX ORDER — HYDRALAZINE HYDROCHLORIDE 20 MG/ML
10 INJECTION INTRAMUSCULAR; INTRAVENOUS ONCE
Status: COMPLETED | OUTPATIENT
Start: 2025-04-30 | End: 2025-04-30

## 2025-04-30 RX ORDER — LIDOCAINE HYDROCHLORIDE 10 MG/ML
INJECTION, SOLUTION INFILTRATION; PERINEURAL PRN
Status: DISCONTINUED | OUTPATIENT
Start: 2025-04-30 | End: 2025-04-30 | Stop reason: HOSPADM

## 2025-04-30 RX ORDER — MIDAZOLAM HYDROCHLORIDE 1 MG/ML
INJECTION, SOLUTION INTRAMUSCULAR; INTRAVENOUS PRN
Status: DISCONTINUED | OUTPATIENT
Start: 2025-04-30 | End: 2025-04-30 | Stop reason: HOSPADM

## 2025-04-30 RX ORDER — VALSARTAN 160 MG/1
320 TABLET ORAL DAILY
Status: DISCONTINUED | OUTPATIENT
Start: 2025-04-30 | End: 2025-04-30

## 2025-04-30 RX ORDER — ASPIRIN 81 MG/1
81 TABLET, CHEWABLE ORAL DAILY
Qty: 30 TABLET | Refills: 0 | Status: SHIPPED | OUTPATIENT
Start: 2025-05-01

## 2025-04-30 RX ORDER — HYDRALAZINE HYDROCHLORIDE 20 MG/ML
10 INJECTION INTRAMUSCULAR; INTRAVENOUS EVERY 6 HOURS PRN
Status: DISCONTINUED | OUTPATIENT
Start: 2025-04-30 | End: 2025-05-01 | Stop reason: HOSPADM

## 2025-04-30 RX ORDER — SODIUM CHLORIDE 0.9 % (FLUSH) 0.9 %
5-40 SYRINGE (ML) INJECTION EVERY 12 HOURS SCHEDULED
Status: DISCONTINUED | OUTPATIENT
Start: 2025-04-30 | End: 2025-05-01 | Stop reason: HOSPADM

## 2025-04-30 RX ORDER — VALSARTAN 160 MG/1
320 TABLET ORAL DAILY
Status: DISCONTINUED | OUTPATIENT
Start: 2025-04-30 | End: 2025-05-01 | Stop reason: HOSPADM

## 2025-04-30 RX ORDER — SODIUM CHLORIDE 9 MG/ML
INJECTION, SOLUTION INTRAVENOUS PRN
Status: DISCONTINUED | OUTPATIENT
Start: 2025-04-30 | End: 2025-05-01 | Stop reason: HOSPADM

## 2025-04-30 RX ORDER — HEPARIN SODIUM 10000 [USP'U]/ML
INJECTION, SOLUTION INTRAVENOUS; SUBCUTANEOUS PRN
Status: DISCONTINUED | OUTPATIENT
Start: 2025-04-30 | End: 2025-04-30 | Stop reason: HOSPADM

## 2025-04-30 RX ADMIN — HEPARIN SODIUM 2000 UNITS: 1000 INJECTION INTRAVENOUS; SUBCUTANEOUS at 05:25

## 2025-04-30 RX ADMIN — HYDRALAZINE HYDROCHLORIDE 10 MG: 20 INJECTION, SOLUTION INTRAMUSCULAR; INTRAVENOUS at 14:36

## 2025-04-30 RX ADMIN — VALSARTAN 320 MG: 160 TABLET, FILM COATED ORAL at 16:50

## 2025-04-30 RX ADMIN — ASPIRIN 81 MG CHEWABLE TABLET 81 MG: 81 TABLET CHEWABLE at 09:16

## 2025-04-30 RX ADMIN — HYDRALAZINE HYDROCHLORIDE 10 MG: 20 INJECTION INTRAMUSCULAR; INTRAVENOUS at 04:33

## 2025-04-30 ASSESSMENT — PAIN SCALES - GENERAL
PAINLEVEL_OUTOF10: 0

## 2025-04-30 NOTE — PROGRESS NOTES
[facility] CARDIOLOGY  Cardiology Care Note                  []Initial visit     [x]Established visit     Patient Name: Jeferson Rosenthal III - :1965 - MRN:[id number]  Primary Cardiologist: Dr. Homar Otero  Consulting Cardiologist: Precious Schroeder MD     Reason for initial visit:NSTEMI    SUBJECTIVE:  Denies any chest pain, lightheadedness, dizziness today.  No further sensation of feeling \"woozy.\"  He remains in sinus bradycardia with HR 37-50's.  He did have several brief episodes of junctional rhythm/beats.  No significant pauses.    Renal function improved.  Creatinine 1.51. Received IV hydralazine overnight.      Assessment and Plan     Elevated troponin/Possible NSTEMI:  HS troponin elevated but flattish trend. EKG sinus nonspecific t wave findings, unchanged from prior EKG.  Nonetheless has had bradycardia and some dizziness/ brief chest pain yesterday.   Proceed with Left heart cath today (1:30 PM with Dr. Sullivan) p..  No current chest pain.  Continue IV ACS heparin.  Continue ASA, statin. Do not give beta blocker due to bradycardia. Echo with NL EF (55-60%), NWMA.     History of nonischemic cardiomyopathy:  EF 40'% ,  EF 55-60%  by echo 3/2024.  Repeat echo  [facility] in  with no significant CAD.  Will repeat echo.  Does not appear volume overloaded and pro BNP low.  Hold valsartan and spironolactone due to IRVIN.     HTN:  BP elevated.  Hold valsartan and ARB for now given IRVIN and upcoming cath.  PRN Hydralazine.  Check orthostatic  as pt has had orthostatic episode (asymptomatic) in recent office visit. No longer takes amlodipine since 3/2025- stopped.     Chronic sinus bradycardia:/episodes of junctional beats/rhythm, brief: No significant pauses.  HR upper 30's-50's on tele review.  Unclear if this is related to his dizziness complaints but am concerned for possible chronotropic incompetence.  Will consult EP

## 2025-04-30 NOTE — PROGRESS NOTES
Cath Lab Recovery Room Arrival Note    Patient arrived to Cardiac Cath/EP Lab Recovery Room for  [facility] Procedure. Staff introduced to patient. Patient identifiers verified with Name and Date of Birth. Procedure verified with patient. Consent forms reviewed and signed by patient or authorized representative and verified. Allergies verified. Patient informed of procedure and plan of care. Questions answered with review.     Patient on cardiac monitor, non-invasive blood pressure, SPO2 monitor. On RA. Patient is A&Ox4. Patient reports no complaints/no distress.

## 2025-04-30 NOTE — PROGRESS NOTES
Patient seen on the day of progress note and examined  and agree with Advance Practice Provider (JEANIE, NP,PA)  assessment and plans as amended.  2025 6:00 PM   Jeferson Rosenthal III  59 y.o.     Today -patient seen this morning on a.m. rounds.  He feels better today.  He says the reason he came to the hospital was that he had 3 days of intermittent chest pain shortness of breath.  He was also dizzy.  In hospital he has been noted to have sinus rhythm and some sinus bradycardia with junctional rhythm.  Pauses have been less than 2.5 seconds.  Was dehydrated and creatinine now up to 1.5 on today's labs.  Hemoglobin is 10.8.  Due to an elevated troponin and history of prior cardiomyopathy was recommended to have a cardiac cath.  He was given hydration last night with an echocardiogram.  His valsartan and Aldactone were held.  Because of the low heart rate his beta-blocker was not started.  He was on heparin last night.  His echocardiogram showed a normal ejection fraction of 55 to 60% with normal right ventricle and mild AI.  The creatinine had come down from 1.9 yesterday and 1.7 on admission.     Exam notable for clear lungs regular rate and rhythm without murmur or gallop no JVD and no edema, vital signs reviewed.  Blood pressure is 180-200         Recent Labs     25  1234 25  1458 25  1840 25  2054   TROPHS 757* 708* 838* 719*          NT Pro-BNP (PG/ML)   Date Value   2025 236 (H)       Assessment/Plan/Discussion/Medical decision makin.  Elevated troponin though relatively flat but in the setting of chest tightness.  We proceeded to heart catheterization.  This shows angiographically normal coronary arteries.  Patient's chest pain has resolved.  Unclear source of elevated troponin.  He does not have symptoms consistent with for example pericarditis or myocarditis.  This may be due to hypertensive urgency.  He is markedly hypertensive overnight and up to 200s today.  2.   Shortness of breath associated with chest tightness.  Echo EF is normal coronaries normal.  3.  Bradycardia, it is possible he may have had some episodes that relate to his symptoms he has been seen by electrophysiology Dr. Rousseau and has declined further evaluation including consideration for pacemaker.  4.  Elevated creatinine has improved.  Unclear previous baseline at discharge 1.5.  5.  Anemia.  6.  Hypertension   patient can resume valsartan at discharge.  Would discontinue spironolactone  Given high blood pressure recommend addition of amlodipine.  Patient can continue aspirin and atorvastatin  Follow-up with Dr. Graf and Caio Padron NP  Stop heparin  Needs control of blood pressure before discharge.  BP (!) 180/90   Pulse 54   Temp 97.1 °F (36.2 °C) (Oral)   Resp 18   Ht 1.854 m (6' 1\")   Wt 90.4 kg (199 lb 4.7 oz)   SpO2 100%   BMI 26.29 kg/m²             Precious Schroeder MD 4/30/2025 6:00 PM       04/28/25    ECHO (TTE) COMPLETE (PRN CONTRAST/BUBBLE/STRAIN/3D) 04/29/2025  5:21 PM (Final)    Interpretation Summary    Left Ventricle: Normal left ventricular systolic function with a visually estimated EF of 55 - 60%. Left ventricle size is normal. Normal wall thickness. Normal wall motion.    Right Ventricle: Right ventricle size is normal. Normal systolic function.    Aortic Valve: Mild regurgitation.    Image quality is fair. Contrast used: Lumason.    Signed by: Precious Schroeder MD on 4/29/2025  5:21 PM     No results found for this or any previous visit.    04/28/25    CARDIAC PROCEDURE 04/30/2025  5:17 PM (Final)    Conclusion    Attempted ultrasound guided right radial artery access but radial artery was too small to access.  Converted to ultrasound-guided right femoral artery access.    Angiographically normal coronary arteries with a \"superdominant\" right coronary artery.    Signed by: Ruben Sullivan MD on 4/30/2025  5:17 PM

## 2025-04-30 NOTE — CONSULTS
[facility] CARDIOLOGY                     Cardiac EP Hospital Care Note     [x]Initial Encounter     [ ]Follow-up     Patient Name: Jeferson Rosenthal III - :1965 - MRN:[id number]   Primary Cardiologist: Caio Padron/Dr. Graf   Consulting Cardiologist: Aryan Rousseau MD     Reason for encounter: sinus bradycardia     HPI:       Jeferson Rosenthal III is a 59 y.o. male with PMH significant for NICM, CKD, hypertension, HLD, and chronic sinus bradycardia. He presented to the ED on 2025 with complaints of feeling woozy with mild disorientation sensation and \"feeling off\". He reported having intermittent chest pain over the weekend and has recently been feeling dizzy and lightheaded. He was transferred from [facility] for NSTEMI and possible LHC. He was noted to be bradycardia in the 40's which is not new for him. EP consulted for bradycardia.     He said he has been living with sinus bradycardia rate 40-50 bpm for many years  Dizziness sometimes occurred when he walked  He denied syncope and said it happened 1 second, he went again fine    He only wants to address chest pain.      Assessment and Plan     Sinus Bradycardia:   - Historically bradycardia   - he did not want or think he should have dual chamber pacemaker  He said dizziness does not bother him and does not want anything for now but cardiac cath  He wants to call my office if he ever gets worse with dizzinesss      NSTEMI:   - LHC planned for later today     NICM:   - Echo 25 with EF 55-60%.      Aryan Rousseau M.D.   Electrophysiology/Cardiology   [facility] [place]   [address]                      [address] [address]   [place] [address]                             [facility] [address] [phone]        ____________________________________________________________     Cardiac testing   25     ECHO (TTE) COMPLETE (PRN CONTRAST/BUBBLE/STRAIN/3D)

## 2025-04-30 NOTE — DISCHARGE SUMMARY
Discharge Summary       PATIENT ID: Jeferson Rosenthal III  MRN: [id number]   YOB: 1965    DATE OF ADMISSION: 4/28/2025  6:00 PM    DATE OF DISCHARGE: 4/30/2025   PRIMARY CARE PROVIDER: Hi Cook MD     ATTENDING PHYSICIAN: Ernesto An  DISCHARGING PROVIDER: Ernesto An MD      CONSULTATIONS: IP CONSULT TO CARDIOLOGY  IP CONSULT TO ELECTROPHYSIOLOGY    PROCEDURES/SURGERIES: Procedure(s):  Left heart cath / coronary angiography  Angiography lower ext right    ADMISSION SUMMARY AND HOSPITAL COURSE:     Jeferson Rosenthal III is a 59 y.o. male PMH NICM, ETOH use, CKD3a, HTN, hyperlipids, HFrEF who presents from [facility] after being sent there from work where he was working and developed worsening dizziness and feeling of disorientation and being unwell. No overt chest pains, although he states he had been having sharp chest pains yesterday, so this weighed on his mind. He has known h/o heart issues, but not a heart blockage in past. States he had heart cath in past which was without any need for stent. He drinks 2-3 beers at most in a day per him and states he has gone a few days at a time without drinking in past year without any signs or symptoms of withdrawal. He quit smoking about 3 years ago he tells me. He states he works at [employer] as a  and it is standard procedure to walk the halls including up and down stairs and he frequently can do this without getting winded and even if he does get a little dizzy he can walk it off fine, this time was very different feeling and situation.     Patient admitted for further evaluation of chest pain and noted elevated troponins, peaked at 838 and trending down.  Initial EKG shows sinus bradycardia with sinus arrhythmia, voltage criteria for LVH and nonspecific T wave abnormality.  Echo shows normal EF.  Patient evaluated by cardiology and underwent cardiac catheterization which shows normal coronaries.  Patient also was noted to have mild IRVIN  [phone]  [facility] [id number]      Phone: [phone]   aspirin 81 MG chewable tablet         DISPOSITION:   * Home With:   OT  PT  HH  RN       Long term SNF/Inpatient Rehab    Independent/assisted living    Hospice    Other:     PATIENT CONDITION AT DISCHARGE:     Functional status    Poor     Deconditioned    * Independent      Cognition    * Lucid     Forgetful     Dementia      Catheters/lines (plus indication)    Brady     PICC     PEG    * None      Code status    * Full code     DNR      PHYSICAL EXAMINATION AT DISCHARGE:    General:          Alert, cooperative, no distress, appears stated age.     HEENT:           Atraumatic, anicteric sclerae, pink conjunctivae                          No oral ulcers, mucosa moist, throat clear, dentition fair  Neck:               Supple, symmetrical  Lungs:             Clear to auscultation bilaterally.  No Wheezing or Rhonchi. No rales.  Chest wall:      No tenderness  No Accessory muscle use.  Heart:              Regular  rhythm,  No  murmur   No edema  Abdomen:        Soft, non-tender. Not distended.  Bowel sounds normal  Extremities:     No cyanosis.  No clubbing,                            Skin turgor normal, Capillary refill normal  Skin:                Not pale.  Not Jaundiced  No rashes   Psych:             Not anxious or agitated.  Neurologic:      Alert, moves all extremities, answers questions appropriately and responds to commands       CHRONIC MEDICAL DIAGNOSES:      Greater than 60 minutes were spent with the patient on counseling and coordination of care    Signed:   Ernesto An MD  4/30/2025  5:30 PM

## 2025-04-30 NOTE — PLAN OF CARE
2316 /89, rechecked and is 191/85, IRIS Blount notified. Hydralazine 10mg IVP ordered, /101 before administration, BP decreased to 176/90 after administration. IRIS Blount updated.     0417 /95, IRIS Blount notified. Hydralazine 10mg IVP ordered, BP decreased to 144/69 after administration.       Problem: Discharge Planning  Goal: Discharge to home or other facility with appropriate resources  Outcome: Progressing     Problem: ABCDS Injury Assessment  Goal: Absence of physical injury  Outcome: Progressing     Problem: Pain  Goal: Verbalizes/displays adequate comfort level or baseline comfort level  4/30/2025 0105 by Ander Alvarez, RN  Outcome: Progressing  4/29/2025 1129 by Natalie Huddleston, RN  Outcome: Progressing

## 2025-04-30 NOTE — PROGRESS NOTES
TRANSFER - OUT Cardiac Cath RR REPORT:    Verbal report given to TORIBIO Torres on Jefesron D Quick III  being transferred to [facility] for routine post-op       Report consisted of patient's Situation, Background, Assessment and   Recommendations(SBAR).     Information from the following report(s) Surgery Report and MAR was reviewed with the receiving nurse.  Premium Assessment: Presents to emergency department  because of falls (Syncope, seizure, or loss of consciousness): No, Age > 70: No, Altered Mental Status, Intoxication with alcohol or substance confusion (Disorientation, impaired judgment, poor safety awaremess, or inability to follow instructions): No, Impaired Mobility: Ambulates or transfers with assistive devices or assistance; Unable to ambulate or transer.: No, Nursing Judgement: No  Lines:   Peripheral IV 04/28/25 Left;Proximal;Anterior Forearm (Active)   Site Assessment Clean, dry & intact 04/30/25 0800   Line Status Capped;Flushed;Infusing 04/30/25 0800   Line Care Cap changed;Connections checked and tightened;Ports disinfected 04/30/25 0800   Phlebitis Assessment No symptoms 04/30/25 0800   Infiltration Assessment 0 04/30/25 0800   Alcohol Cap Used Yes 04/30/25 0800   Dressing Status Clean, dry & intact 04/30/25 0800   Dressing Type Transparent 04/30/25 0800        Opportunity for questions and clarification was provided.      Patient transported with:  Monitor and Registered Nurse

## 2025-04-30 NOTE — PROGRESS NOTES
Hospitalist Progress Note  Ernesto An MD  Answering service: [phone]        Date of Service:  2025  NAME:  Jeferson Rosenthal III  :  1965  MRN:  [id number]      Admission Summary:     Patient admitted with NSTEMI.    Interval history / Subjective:     No chest pain this a.m.     Assessment & Plan:     Suspect NSTEMI  -Patient presented with chest pain and elevated troponins, peaked at 838 and trending down  -EKG shows sinus bradycardia with sinus arrhythmia, voltage criteria for LVH, nonspecific T wave abnormality  -Echo shows normal EF, s/p cardiac cath today, result  -Cardiology following    Hypertension  -Blood pressure elevated  - Aldactone held on account of IRVIN  - Continue Diovan    Dyslipidemia  - Continue statin    CKD stage III  - Renal function stable at baseline  - Continue monitor    Alcohol dependence  -Monitor on CIWA protocol     Code status: Full  Prophylaxis: SCDs  Care Plan discussed with: Patient  Anticipated Disposition: 24 to 48 hours  Central Line:   None             Review of Systems:   Pertinent items are noted in HPI.         Vital Signs:    Last 24hrs VS reviewed since prior progress note. Most recent are:  Vitals:    25 1600   BP: (!) 180/90   Pulse: 54   Resp: 18   Temp:    SpO2: 100%         Intake/Output Summary (Last 24 hours) at 2025 1624  Last data filed at 2025 1318  Gross per 24 hour   Intake 1246.96 ml   Output 5 ml   Net 1241.96 ml        Physical Examination:     I had a face to face encounter with this patient and independently examined them on 2025 as outlined below:          General : alert x 3, awake, no acute distress,   HEENT: PEERL, EOMI, moist mucus membrane, TM clear  Neck: supple, no JVD, no meningeal signs  Chest: Clear to auscultation bilaterally   CVS: S1 S2 heard, Capillary refill less than 2 seconds  Abd: soft/ non tender, non

## 2025-04-30 NOTE — PROGRESS NOTES
Cardiac Cath Lab Procedure Area Arrival Note:    Jeferson Roesnthal III arrived to Cardiac Cath Lab, Procedure Area. Patient identifiers verified with NAME and DATE OF BIRTH. Procedure verified with patient. Consent forms verified. Allergies verified. Patient informed of procedure and plan of care. Questions answered with review. Patient voiced understanding of procedure and plan of care.    Patient on cardiac monitor, non-invasive blood pressure, SPO2 monitor. On room air or O2.  IV of NS on pump at 50 ml/hr. Patient status doing well without problems. Patient is A&Ox 4. Patient reports no complaints.     Patient medicated during procedure with orders obtained and verified by Dr. Sullivan.    Refer to patients Cardiac Cath Lab PROCEDURE REPORT for vital signs, assessment, status, and response during procedure, printed at end of case. Printed report on chart or scanned into chart.

## 2025-04-30 NOTE — PROGRESS NOTES
CATH LAB to RECOVERY ROOM REPORT    Procedure: [facility]    MD: ELDA Sullivan MD    The procedure was diagnostic only.    Verbal Report given to Recovery Nurse on patient being transferred to Cardiac Cath Lab  for routine post-op. Patient stable upon transfer to .  Vitals, mental status, MAR, procedural summary discussed with recovery RN.    Medication given during procedure:    Contrast: 50 mL                          Versed: 4 mg                                                               Fentanyl: 100 mcg                                                                 Sheaths:    Rt radial attempted only.    Right femoral artery 6 fr sheath pulled at 1314 pm with perclose.

## 2025-04-30 NOTE — PROGRESS NOTES
1077  TRANSFER - IN REPORT:    Verbal report received from TORIBIO Worthington on Jeferson D Quick III  being received from [facility]  for ordered procedure      Report consisted of patient's Situation, Background, Assessment and   Recommendations(SBAR).     Information from the following report(s) Nurse Handoff Report was reviewed with the receiving nurse.    Opportunity for questions and clarification was provided.      Assessment completed upon patient's arrival to unit and care assumed.

## 2025-04-30 NOTE — TELEPHONE ENCOUNTER
Patient with a history of hypertension.  Works as a  at a school was lifting weights recently without dyspnea exertion.  He had onset of dizziness lightheadedness felt unwell and then reported chest pain the prior day and shortness of breath.  Somewhat ambiguous history.  Heart rate was 45 which has been previous sinus bradycardia blood pressure was 179.  He was admitted via [facility].  He has a history of prior cardiomyopathy nonischemic with recovered EF.  He had no significant CAD by cath in 2016.  Due to vague chest pain and elevated troponin proceed with cath today cath is clean with normal coronaries.  Echocardiogram shows normal LV function.  Blood pressure has increased during hospitalization  Creatinine increased during hospitalization unclear baseline    Hospitalist are discharging  Our recommendation has been to hold spironolactone and stop that though can continue valsartan.  He is already discharged but I believe he may need the addition of amlodipine 5 mg daily please    Plan  Add Amlodipine 5 mg daily  Follow-up with Dr. Graf or Caio Padron NP in the next week or 2 if possible.  Patient needs to keep blood pressure diary.      04/28/25    ECHO (TTE) COMPLETE (PRN CONTRAST/BUBBLE/STRAIN/3D) 04/29/2025  5:21 PM (Final)    Interpretation Summary    Left Ventricle: Normal left ventricular systolic function with a visually estimated EF of 55 - 60%. Left ventricle size is normal. Normal wall thickness. Normal wall motion.    Right Ventricle: Right ventricle size is normal. Normal systolic function.    Aortic Valve: Mild regurgitation.    Image quality is fair. Contrast used: Lumason.    Signed by: Precious Schroeder MD on 4/29/2025  5:21 PM     No results found for this or any previous visit.     04/28/25    CARDIAC PROCEDURE 04/30/2025  5:17 PM (Final)    Conclusion    Attempted ultrasound guided right radial artery access but radial artery was too small to access.

## 2025-04-30 NOTE — PROGRESS NOTES
TRANSFER - IN Cath Lab RR REPORT:    Verbal report received from Margret Perdomo RN on Jeferson Rosenthal III  being received from the Cardiac Cath Lab for routine progression of care. Report consisted of patient’s Situation, Background, Assessment and Recommendations(SBAR). Procedural summary and MAR reviewed with receiving nurse. Opportunity for questions and clarification was provided. Assessment completed upon patient’s arrival to Cardiac Cath Lab RECOVERY AREA and care assumed.       Cardiac Cath Lab Recovery Arrival Note:    Jeferson Rosenthal III arrived to CCL recovery area.  Patient procedure= LHC. Patient on cardiac monitor, non-invasive blood pressure, SPO2 monitor. On RA.. Patient is A&Ox 4. Patient reports no complaints/no distress.    PROCEDURE SITE CHECK:    Procedure site: No bleeding and no hematoma, no pain/discomfort reported at procedure site.     No change in patient status. Continue to monitor patient and status.     1345  Pt sleeping, easy to wake, denies complaints at this time.  Pt does not want food/drink. No bleeding/swelling noted to right groin site. Will continue to monitor.      1440  Pt transported back to room 461 with chart, family, and boxed lunch.

## 2025-05-01 NOTE — PLAN OF CARE
I have reviewed discharge instruction with Patient. Time for questions was allowed and the Patient verbalized understanding. The following were sent with the patient upon discharge.    Red Cardiac Surgery Bracelet: N/A  Valve Card: N/A  Antibiotic Card (for Valves): N/A  Stent Card: N/A  Pacemaker home transmitter: N/A  Signed Prescriptions (controlled substances and/or no pharmacy on record): N/A      Problem: Discharge Planning  Goal: Discharge to home or other facility with appropriate resources  Outcome: Completed  Flowsheets (Taken 4/30/2025 1500 by Melissa Tomlin, RN)  Discharge to home or other facility with appropriate resources:   Identify barriers to discharge with patient and caregiver   Identify discharge learning needs (meds, wound care, etc)     Problem: ABCDS Injury Assessment  Goal: Absence of physical injury  Outcome: Completed     Problem: Pain  Goal: Verbalizes/displays adequate comfort level or baseline comfort level  4/30/2025 2209 by Ander Alvarez, RN  Outcome: Completed  4/30/2025 1515 by Melissa Tomlin, RN  Outcome: Adequate for Discharge     Problem: Safety - Adult  Goal: Free from fall injury  Outcome: Completed

## 2025-05-01 NOTE — PROGRESS NOTES
5/1/2025        RE: Jeferson Rosenthal III         [address]  [address]  [place] [address]          To Whom It May Concern,      Due to medical reasons, Jeferson Rosenthal III may  may return to work on 5/4/25    He was in [facility] inpatient admitted from 4/28/25-- 4/30/25        Sincerely,      Shilpa Garcia MD

## 2025-05-02 NOTE — PROGRESS NOTES
Physician Progress Note      PATIENT:               ROB ASHRAF  CSN #:                  [id number]  :                       1965  ADMIT DATE:       2025 6:00 PM  DISCH DATE:        2025 9:45 PM  RESPONDING  PROVIDER #:        Ernesto An MD          QUERY TEXT:    Acute Kidney Injury is documented in the medical record progress note by   Rasheeda Mandujano, LEAH - NP at 2025 & discharge summary by Ernesto An MD   at 2025.  Please provide additional clinical indicators supportive of   your documentation. Or please document if the diagnosis of IRVIN has been ruled   out after study.    The clinical indicators include:  59 year old male admitted with chest pain    -\"Diabetes, chronic systolic CHF, hypertension\"    -\"Dizziness and feeling of disorientation. fatigue and near-syncope.  CKD3a.   Monitor BMP with treatment.\" (H&P by H&P by Julio Philippe MD on 2025)    -\"Hold valsartan Aldactone anticipation of possible cath with elevated   creatinine.\" (PN by Precious tyler MD at 2025)    - \"Was dehydrated and creatinine now up to 1.5 on today's labs.  creatinine   had come down from 1.9 yesterday and 1.7 on admission. Unclear previous   baseline at discharge 1.5. IRVIN on CKD IIIa:  Creatinine improving with IV   hydration.  Creatinine was 1.61 in 2024 per notes.\" (PN by Rasheeda Mandujano,   APRN - NP at     -\"Noted to have mild IRVIN on CKD stage III improved with IV fluids. On account   of IRVIN.\" (Discharge summary by Discharge Summary by Ernesto An MD on   2025)    -\"On : Cr-1.75, BUN-18, GFR-44\" (From EPIC review results)  -\"On : Cr-1.92, BUN-22, GFR-39\" (From EPIC review results)  -\"On : Cr-1.51, BUN-18, GFR-53 (From EPIC review results)    -0.9 % sodium chloride infusion on  & , Held Aldactone & Valsartan   (From EPIC MAR)    Thank you,  Michell MTZS CDS  Options provided:  -- Acute kidney injury evidenced by, Please document evidence as

## 2025-05-09 NOTE — PROGRESS NOTES
Physician Progress Note      PATIENT:               ROB ASHRAF  CSN #:                  [id number]  :                       1965  ADMIT DATE:       2025 6:00 PM  DISCH DATE:        2025 9:45 PM  RESPONDING  PROVIDER #:        Precious Schroeder MD          QUERY TEXT:    Hypertensive urgency is documented Progress Notes by Rasheeda Mandujano APRN - NP at 2025 & Progress Notes by Precious Schroeder MD at 2025. Based   on your medical judgment, please clarify the POA status at the time of the   order to admit the patient to inpatient status:    The clinical indicators include:  59-year-old male admitted with chest pain    -\"CKD, cardiomyopathy, hyperlipidemia, chronic systolic CHF\"    -\"dizziness and feeling of disorientation, positive for fatigue and   near-syncope, Essential hypertension. Troponin elevation. Possible also is   poorly controlled HTN.  Start metoprolol 25mg bid\" (H&P by Julio Philippe MD   on 2025)    -\"presented with chest pain and elevated troponins, peaked at 838 and trending   down. EKG shows sinus bradycardia with sinus arrhythmia. Continue metoprolol,   Aldactone, Diovan\" (PN by Ernesto An MD at 2025)    -\"Elevated troponin though relatively flat but in the setting of chest   tightness. Unclear source of elevated troponin.  This may be due to   hypertensive urgency.  He is markedly hypertensive overnight and up to 200s   today.  PRN Hydralazine\" (PN by Rasheeda Mandujano APRN - NP at 2025)    -\"HTN:  BP elevated.  Received valsartan and spironolactone this a.m.  Hold   further doses for now\" (Consults by Rasheeda Mandujano APRN - NP at 2025)    -BP: 179/98-; 192/89, 220/101-; 197/95; 201/85- (From EPIC   flowsheet)    -spironolactone (ALDACTONE) tablet 50 mg, valsartan (DIOVAN) tablet 320 mg,   hydralazine (APRESOLINE) injection 10 mg (From EPIC MAR)    Thank you,  Michell GIRON CDS  Options provided:  -- Hypertensive urgency

## 2025-05-27 ENCOUNTER — TELEPHONE (OUTPATIENT)
Age: 60
End: 2025-05-27

## 2025-05-27 RX ORDER — VALSARTAN 320 MG/1
320 TABLET ORAL DAILY
Qty: 30 TABLET | Refills: 3 | Status: SHIPPED | OUTPATIENT
Start: 2025-05-27

## 2025-05-27 NOTE — TELEPHONE ENCOUNTER
Requested Prescriptions     Signed Prescriptions Disp Refills    valsartan (DIOVAN) 320 MG tablet 30 tablet 3     Sig: Take 1 tablet by mouth daily     Authorizing Provider: ALIE COPE     Ordering User: TREY EUGENE per NP

## 2025-06-02 ENCOUNTER — OFFICE VISIT (OUTPATIENT)
Age: 60
End: 2025-06-02
Payer: COMMERCIAL

## 2025-06-02 VITALS
HEART RATE: 40 BPM | HEIGHT: 73 IN | BODY MASS INDEX: 26.77 KG/M2 | WEIGHT: 202 LBS | DIASTOLIC BLOOD PRESSURE: 62 MMHG | SYSTOLIC BLOOD PRESSURE: 170 MMHG | OXYGEN SATURATION: 96 %

## 2025-06-02 DIAGNOSIS — I49.5 SINUS NODE DYSFUNCTION (HCC): ICD-10-CM

## 2025-06-02 DIAGNOSIS — N18.31 CKD STAGE 3A, GFR 45-59 ML/MIN (HCC): ICD-10-CM

## 2025-06-02 DIAGNOSIS — I42.8 NICM (NONISCHEMIC CARDIOMYOPATHY) (HCC): Primary | ICD-10-CM

## 2025-06-02 DIAGNOSIS — I10 PRIMARY HYPERTENSION: ICD-10-CM

## 2025-06-02 DIAGNOSIS — R00.1 BRADYCARDIA: ICD-10-CM

## 2025-06-02 PROCEDURE — 99214 OFFICE O/P EST MOD 30 MIN: CPT | Performed by: NURSE PRACTITIONER

## 2025-06-02 PROCEDURE — 3077F SYST BP >= 140 MM HG: CPT | Performed by: NURSE PRACTITIONER

## 2025-06-02 PROCEDURE — 3078F DIAST BP <80 MM HG: CPT | Performed by: NURSE PRACTITIONER

## 2025-06-02 RX ORDER — AMLODIPINE BESYLATE 5 MG/1
5 TABLET ORAL DAILY
Qty: 30 TABLET | Refills: 1 | Status: SHIPPED | OUTPATIENT
Start: 2025-06-02

## 2025-06-02 ASSESSMENT — PATIENT HEALTH QUESTIONNAIRE - PHQ9
1. LITTLE INTEREST OR PLEASURE IN DOING THINGS: NOT AT ALL
SUM OF ALL RESPONSES TO PHQ QUESTIONS 1-9: 0
SUM OF ALL RESPONSES TO PHQ QUESTIONS 1-9: 0
2. FEELING DOWN, DEPRESSED OR HOPELESS: NOT AT ALL
SUM OF ALL RESPONSES TO PHQ QUESTIONS 1-9: 0
SUM OF ALL RESPONSES TO PHQ QUESTIONS 1-9: 0

## 2025-06-02 NOTE — PROGRESS NOTES
1. Have you been to the ER, urgent care clinic since your last visit?  Hospitalized since your last visit? Yes. Moe was originally seen at [facility] ER on 04/28/25 due to chest pains but then transferred to [facility] due to NSTEMI (non-ST elevated myocardial infarction) (HCC)     2. Have you seen or consulted any other health care providers outside of the [facility] since your last visit?  Include any pap smears or colon screening. No    
medications for this visit.     Allergies   Allergen Reactions    Sacubitril-Valsartan Other (See Comments)     Lip swelling          Past Medical History:   Diagnosis Date    Chronic alcohol use     Chronic sinus bradycardia     CKD stage 3a, GFR 45-59 ml/min ([facility])     HFrEF (heart failure with reduced ejection fraction) ([facility])     Hyperlipidemia     Hypertension     NICM (nonischemic cardiomyopathy) ([facility])      No family history on file.  Past Surgical History:   Procedure Laterality Date    CARDIAC PROCEDURE N/A 2025    Left heart cath / coronary angiography performed by Ruben Sullivan MD at [facility] CARDIAC CATH LAB    COLONOSCOPY N/A 10/15/2024    COLONOSCOPY performed by Lukas Macario MD at [facility] MAIN OR    INVASIVE VASCULAR N/A 2025    Angiography lower ext right performed by Ruben Sullivan MD at [facility] CARDIAC CATH LAB     Social History     Tobacco Use    Smoking status: Former     Current packs/day: 0.00     Average packs/day: 1 pack/day for 35.3 years (35.3 ttl pk-yrs)     Types: Cigarettes     Start date: 3/1/1988     Quit date: 2023     Years since quittin.9     Passive exposure: Past    Smokeless tobacco: Never    Tobacco comments:     16 months since quitting as of 10/15/24   Substance Use Topics    Alcohol use: Yes     Alcohol/week: 6.0 standard drinks of alcohol     Types: 6 Cans of beer per week        Lab Results   Component Value Date/Time    CHOL 118 2025 01:16 AM    HDL 50 2025 01:16 AM    LDL 43 2025 01:16 AM    VLDL 25 2025 01:16 AM        No results found for: \"HBA1C\", \"[id number]\"     Lab Results   Component Value Date/Time     2025 04:26 AM    K 3.8 2025 04:26 AM     2025 04:26 AM    CO2 22 2025 04:26 AM    BUN 18 2025 04:26 AM    GLOB 4.0 2025 12:34 PM    ALT 18 2025 12:34 PM    AST 26 2025 12:34 PM        Lab Results   Component Value Date/Time    WBC 4.4 2025 04:26 AM    HGB

## 2025-06-25 ENCOUNTER — TELEPHONE (OUTPATIENT)
Age: 60
End: 2025-06-25

## 2025-06-25 DIAGNOSIS — I10 PRIMARY HYPERTENSION: Primary | ICD-10-CM

## 2025-06-25 RX ORDER — AMLODIPINE BESYLATE 5 MG/1
5 TABLET ORAL DAILY
Qty: 30 TABLET | Refills: 0 | Status: SHIPPED | OUTPATIENT
Start: 2025-06-25

## 2025-06-25 NOTE — TELEPHONE ENCOUNTER
Requested Prescriptions     Signed Prescriptions Disp Refills    amLODIPine (NORVASC) 5 MG tablet 30 tablet 0     Sig: Take 1 tablet by mouth daily     Authorizing Provider: CAIO PADRON     Ordering User: BERTHA CARBAJAL      Future Appointments   Date Time Provider Department Center   7/14/2025 11:20 AM Caio Padron, LEAH - NP [facility] BS AMB      Per Verbal Order by MD/NP

## 2025-06-27 ENCOUNTER — TELEPHONE (OUTPATIENT)
Age: 60
End: 2025-06-27

## 2025-06-27 NOTE — TELEPHONE ENCOUNTER
amLODIPine (NORVASC) 5 MG tablet 30 tablet 0 6/25/2025 --    Sig - Route: Take 1 tablet by mouth daily - Oral    Sent to pharmacy as: amLODIPine Besylate 5 MG Oral Tablet (NORVASC)    Cosign for Ordering: Accepted by Caio Padron APRN - NP on 6/26/2025  8:55 AM    E-Prescribing Status: Receipt confirmed by pharmacy (6/25/2025  1:43 PM EDT)

## 2025-07-03 ENCOUNTER — HOSPITAL ENCOUNTER (INPATIENT)
Facility: HOSPITAL | Age: 60
LOS: 1 days | Discharge: HOME | DRG: 310 | End: 2025-07-04
Attending: EMERGENCY MEDICINE | Admitting: INTERNAL MEDICINE
Payer: COMMERCIAL

## 2025-07-03 DIAGNOSIS — R79.89 ELEVATED TROPONIN: Primary | ICD-10-CM

## 2025-07-03 DIAGNOSIS — R23.3 PETECHIAE: ICD-10-CM

## 2025-07-03 DIAGNOSIS — D69.6 THROMBOCYTOPENIA: ICD-10-CM

## 2025-07-03 DIAGNOSIS — I10 PRIMARY HYPERTENSION: ICD-10-CM

## 2025-07-03 DIAGNOSIS — I10 HYPERTENSION, UNSPECIFIED TYPE: ICD-10-CM

## 2025-07-03 LAB
ALBUMIN SERPL-MCNC: 4.2 G/DL (ref 3.5–5)
ALBUMIN/GLOB SERPL: 1.1 (ref 1.1–2.2)
ALP SERPL-CCNC: 57 U/L (ref 45–117)
ALT SERPL-CCNC: 25 U/L (ref 12–78)
ANION GAP SERPL CALC-SCNC: 5 MMOL/L (ref 2–12)
AST SERPL-CCNC: 30 U/L (ref 15–37)
BASOPHILS # BLD: 0.01 K/UL (ref 0–0.1)
BASOPHILS NFR BLD: 0.3 % (ref 0–1)
BILIRUB SERPL-MCNC: 1.1 MG/DL (ref 0.2–1)
BUN SERPL-MCNC: 15 MG/DL (ref 6–20)
BUN/CREAT SERPL: 9 (ref 12–20)
CALCIUM SERPL-MCNC: 8.9 MG/DL (ref 8.5–10.1)
CHLORIDE SERPL-SCNC: 104 MMOL/L (ref 97–108)
CO2 SERPL-SCNC: 28 MMOL/L (ref 21–32)
COMMENT:: NORMAL
COMMENT:: NORMAL
CREAT SERPL-MCNC: 1.69 MG/DL (ref 0.7–1.3)
DIFFERENTIAL METHOD BLD: ABNORMAL
EKG ATRIAL RATE: 47 BPM
EKG DIAGNOSIS: NORMAL
EKG P AXIS: 47 DEGREES
EKG P-R INTERVAL: 142 MS
EKG Q-T INTERVAL: 498 MS
EKG QRS DURATION: 88 MS
EKG QTC CALCULATION (BAZETT): 440 MS
EKG R AXIS: 29 DEGREES
EKG T AXIS: -49 DEGREES
EKG VENTRICULAR RATE: 47 BPM
EOSINOPHIL # BLD: 0.14 K/UL (ref 0–0.4)
EOSINOPHIL NFR BLD: 3.8 % (ref 0–7)
ERYTHROCYTE [DISTWIDTH] IN BLOOD BY AUTOMATED COUNT: 11.4 % (ref 11.5–14.5)
GLOBULIN SER CALC-MCNC: 3.8 G/DL (ref 2–4)
GLUCOSE SERPL-MCNC: 85 MG/DL (ref 65–100)
HCT VFR BLD AUTO: 35.4 % (ref 36.6–50.3)
HGB BLD-MCNC: 12.2 G/DL (ref 12.1–17)
IMM GRANULOCYTES # BLD AUTO: 0.01 K/UL (ref 0–0.04)
IMM GRANULOCYTES NFR BLD AUTO: 0.3 % (ref 0–0.5)
LYMPHOCYTES # BLD: 0.94 K/UL (ref 0.8–3.5)
LYMPHOCYTES NFR BLD: 25.2 % (ref 12–49)
MAGNESIUM SERPL-MCNC: 1.6 MG/DL (ref 1.6–2.4)
MCH RBC QN AUTO: 28.2 PG (ref 26–34)
MCHC RBC AUTO-ENTMCNC: 34.5 G/DL (ref 30–36.5)
MCV RBC AUTO: 81.9 FL (ref 80–99)
MONOCYTES # BLD: 0.38 K/UL (ref 0–1)
MONOCYTES NFR BLD: 10.2 % (ref 5–13)
NEUTS SEG # BLD: 2.25 K/UL (ref 1.8–8)
NEUTS SEG NFR BLD: 60.2 % (ref 32–75)
NRBC # BLD: 0 K/UL (ref 0–0.01)
NRBC BLD-RTO: 0 PER 100 WBC
PLATELET # BLD AUTO: 146 K/UL (ref 150–400)
PMV BLD AUTO: 10.5 FL (ref 8.9–12.9)
POTASSIUM SERPL-SCNC: 4.2 MMOL/L (ref 3.5–5.1)
PROT SERPL-MCNC: 8 G/DL (ref 6.4–8.2)
RBC # BLD AUTO: 4.32 M/UL (ref 4.1–5.7)
SODIUM SERPL-SCNC: 137 MMOL/L (ref 136–145)
SPECIMEN HOLD: NORMAL
TROPONIN I SERPL HS-MCNC: 403 NG/L (ref 0–76)
TROPONIN I SERPL HS-MCNC: 433 NG/L (ref 0–76)
WBC # BLD AUTO: 3.7 K/UL (ref 4.1–11.1)

## 2025-07-03 PROCEDURE — 2500000003 HC RX 250 WO HCPCS: Performed by: INTERNAL MEDICINE

## 2025-07-03 PROCEDURE — 80053 COMPREHEN METABOLIC PANEL: CPT

## 2025-07-03 PROCEDURE — G0378 HOSPITAL OBSERVATION PER HR: HCPCS

## 2025-07-03 PROCEDURE — 83735 ASSAY OF MAGNESIUM: CPT

## 2025-07-03 PROCEDURE — 93005 ELECTROCARDIOGRAM TRACING: CPT

## 2025-07-03 PROCEDURE — 6370000000 HC RX 637 (ALT 250 FOR IP): Performed by: EMERGENCY MEDICINE

## 2025-07-03 PROCEDURE — 84484 ASSAY OF TROPONIN QUANT: CPT

## 2025-07-03 PROCEDURE — 99285 EMERGENCY DEPT VISIT HI MDM: CPT

## 2025-07-03 PROCEDURE — 1200000000 HC SEMI PRIVATE

## 2025-07-03 PROCEDURE — 85025 COMPLETE CBC W/AUTO DIFF WBC: CPT

## 2025-07-03 RX ORDER — MAGNESIUM SULFATE IN WATER 40 MG/ML
2000 INJECTION, SOLUTION INTRAVENOUS PRN
Status: DISCONTINUED | OUTPATIENT
Start: 2025-07-03 | End: 2025-07-04 | Stop reason: HOSPADM

## 2025-07-03 RX ORDER — POLYETHYLENE GLYCOL 3350 17 G/17G
17 POWDER, FOR SOLUTION ORAL DAILY PRN
Status: DISCONTINUED | OUTPATIENT
Start: 2025-07-03 | End: 2025-07-04 | Stop reason: HOSPADM

## 2025-07-03 RX ORDER — SODIUM CHLORIDE 0.9 % (FLUSH) 0.9 %
5-40 SYRINGE (ML) INJECTION PRN
Status: DISCONTINUED | OUTPATIENT
Start: 2025-07-03 | End: 2025-07-04 | Stop reason: HOSPADM

## 2025-07-03 RX ORDER — ASPIRIN 325 MG
325 TABLET ORAL
Status: COMPLETED | OUTPATIENT
Start: 2025-07-03 | End: 2025-07-03

## 2025-07-03 RX ORDER — POTASSIUM CHLORIDE 7.45 MG/ML
10 INJECTION INTRAVENOUS PRN
Status: DISCONTINUED | OUTPATIENT
Start: 2025-07-03 | End: 2025-07-04 | Stop reason: HOSPADM

## 2025-07-03 RX ORDER — ACETAMINOPHEN 650 MG/1
650 SUPPOSITORY RECTAL EVERY 6 HOURS PRN
Status: DISCONTINUED | OUTPATIENT
Start: 2025-07-03 | End: 2025-07-04 | Stop reason: HOSPADM

## 2025-07-03 RX ORDER — ENOXAPARIN SODIUM 100 MG/ML
40 INJECTION SUBCUTANEOUS DAILY
Status: DISCONTINUED | OUTPATIENT
Start: 2025-07-03 | End: 2025-07-03

## 2025-07-03 RX ORDER — POTASSIUM CHLORIDE 750 MG/1
40 TABLET, EXTENDED RELEASE ORAL PRN
Status: DISCONTINUED | OUTPATIENT
Start: 2025-07-03 | End: 2025-07-04 | Stop reason: HOSPADM

## 2025-07-03 RX ORDER — SODIUM CHLORIDE 9 MG/ML
INJECTION, SOLUTION INTRAVENOUS PRN
Status: DISCONTINUED | OUTPATIENT
Start: 2025-07-03 | End: 2025-07-04 | Stop reason: HOSPADM

## 2025-07-03 RX ORDER — SODIUM CHLORIDE 0.9 % (FLUSH) 0.9 %
5-40 SYRINGE (ML) INJECTION EVERY 12 HOURS SCHEDULED
Status: DISCONTINUED | OUTPATIENT
Start: 2025-07-03 | End: 2025-07-04 | Stop reason: HOSPADM

## 2025-07-03 RX ORDER — ONDANSETRON 4 MG/1
4 TABLET, ORALLY DISINTEGRATING ORAL EVERY 8 HOURS PRN
Status: DISCONTINUED | OUTPATIENT
Start: 2025-07-03 | End: 2025-07-04 | Stop reason: HOSPADM

## 2025-07-03 RX ORDER — ONDANSETRON 2 MG/ML
4 INJECTION INTRAMUSCULAR; INTRAVENOUS EVERY 6 HOURS PRN
Status: DISCONTINUED | OUTPATIENT
Start: 2025-07-03 | End: 2025-07-04 | Stop reason: HOSPADM

## 2025-07-03 RX ORDER — ACETAMINOPHEN 325 MG/1
650 TABLET ORAL EVERY 6 HOURS PRN
Status: DISCONTINUED | OUTPATIENT
Start: 2025-07-03 | End: 2025-07-04 | Stop reason: HOSPADM

## 2025-07-03 RX ADMIN — ASPIRIN 325 MG: 325 TABLET ORAL at 17:08

## 2025-07-03 RX ADMIN — Medication 10 ML: at 22:55

## 2025-07-03 ASSESSMENT — PAIN SCALES - GENERAL: PAINLEVEL_OUTOF10: 0

## 2025-07-03 NOTE — H&P
History and Physical    Date of Service:  7/3/2025  Primary Care Provider: Hi Cook MD  Source of information: patient, electronic medical record    Chief Complaint: Abnormal Lab (Elevated K+ at [facility])      History of Presenting Illness:   Jeferson Rosenthal III is a 59 y.o. male with a past medical history of hypertension, hyperlipidemia, nonischemic cardiomyopathy, CKD, chronically elevated troponin, chronic thrombocytopenia, who comes to the emergency room with chief complaint of petechial rash to both lower extremities.  Patient is physically active, works as a , used to play football.  He goes to the gym several times a week, and was at the gym this morning.  He lifted some weights.  He comes to the emergency room because of petechial rash on his lower extremities.  In the emergency room, he was found to have platelets of 148, troponin of 403, creatinine of 1.69.  EKG shows sinus bradycardia with a heart rate of 47 bpm.  Patient denies any chest pain, shortness of breath, fevers, recent illness, or any other complaints his only concern is the petechial rash.  Chart was reviewed.  Patient was hospitalized in April of this year due to chest pain.  The echocardiogram on 4/29/2025 showed a normal left ventricular systolic function with an estimated EF of 55 to 60%, normal size of left ventricle, normal wall thickness, normal wall motion.  Due to chest pain and elevated troponins (range 708-838), patient went underwent a cardiac catheterization on 4/30 which showed normal coronary arteries with a \"superdominant\" right coronary artery.  Platelets at the time were 165 and 152.  Patient did not have a liver ultrasound.    The patient denies any headache, blurry vision, sore throat, trouble swallowing, trouble with speech, chest pain, SOB, cough, fever, chills, N/V/D, abd pain, urinary symptoms, constipation, recent travels, sick contacts, focal or generalized neurological symptoms, falls,

## 2025-07-03 NOTE — ED NOTES
1:15 PM    58 yo male h/o HTN, CKD stage III, and NSTEMI (April of this year) presenting with concerns of rash to BLLE and abnormal labwork. Pt reports he went to urgent care prior to this visit for his rash and they incidentally found potassium of 5.5 and referred him to the ER. Pt denies any symptoms at this time including chest pain, SOB, palpitations, or fatigue.    I have evaluated the patient as the Provider in Rapid Medical Evaluation (RME). I have reviewed his vital signs and the triage nurse assessment. I have talked with the patient and any available family and advised that I am the provider in triage and have ordered the appropriate study to initiate their work up based on the clinical presentation during my assessment. I have advised that the patient will be accommodated in the Main ED as soon as possible. I have also requested to contact the triage nurse or myself immediately if the patient experiences any changes in their condition during this brief waiting period.  KARUNA Chinchilla Alyson E, PA-C  07/03/25 6143

## 2025-07-03 NOTE — ED PROVIDER NOTES
[facility] EMERGENCY DEPARTMENT  EMERGENCY DEPARTMENT ENCOUNTER      Pt Name: Jeferson Rosenthal III  MRN: [id number]  Birthdate 1965  Date of evaluation: 7/3/2025  Provider: Christopher Salcido DO      HISTORY OF PRESENT ILLNESS      HPI    59-year-old male presents to the ED for evaluation of bilateral lower extremity rash.  He denies any associated itching or pain to the area but noticed that a couple days ago.  [facility] and they found an elevated K and low platelet count so they sent him to the ED for further evaluation.  On arrival to the ED he is hypertensive but afebrile with other vital signs stable satting 97% on room air.  He denies any chest pain or difficulty breathing.  Denies any new exposures or detergents or other allergic reaction type symptoms.    Nursing Notes were reviewed.    REVIEW OF SYSTEMS         Review of Systems        PAST MEDICAL HISTORY     Past Medical History:   Diagnosis Date    Chronic alcohol use     Chronic sinus bradycardia     CKD stage 3a, GFR 45-59 ml/min ([facility])     HFrEF (heart failure with reduced ejection fraction) ([facility])     Hyperlipidemia     Hypertension     NICM (nonischemic cardiomyopathy) ([facility])          SURGICAL HISTORY       Past Surgical History:   Procedure Laterality Date    CARDIAC PROCEDURE N/A 4/30/2025    Left heart cath / coronary angiography performed by Ruben Sullivan MD at [facility] CARDIAC CATH LAB    COLONOSCOPY N/A 10/15/2024    COLONOSCOPY performed by Lukas Macario MD at [facility] MAIN OR    INVASIVE VASCULAR N/A 4/30/2025    Angiography lower ext right performed by Ruben Sullivan MD at [facility] CARDIAC CATH LAB         CURRENT MEDICATIONS       Previous Medications    ALLOPURINOL (ZYLOPRIM) 300 MG TABLET    Take 1 tablet by mouth once a week    AMLODIPINE (NORVASC) 5 MG TABLET    Take 1 tablet by mouth daily    ATORVASTATIN (LIPITOR) 20 MG TABLET    Take 1 tablet by mouth daily    EPINEPHRINE (EPIPEN  admit to the hospital service for further care and assessment.    Perfect Serve Consult for Admission  4:32 PM    ED Room Number: ER10/10  Patient Name and age:  Jeferson Rosenthal III 59 y.o.  male  Working Diagnosis:   1. Elevated troponin    2. Petechiae    3. Thrombocytopenia    4. Hypertension, unspecified type        COVID-19 Suspicion: No  Sepsis present:  No  Reassessment needed: No  Code Status:  Full Code  Readmission: No  Isolation Requirements: no  Recommended Level of Care: telemetry  Department: [facility] Adult ED - [phone]    Other: 59-year-old male presents with bilateral lower extremity petechial rash.  No chest pain but hypertensive on arrival and troponin elevated at 433.      REASSESSMENT     ED Course as of 07/03/25 1632   Thu Jul 03, 2025   1445 EKG shows a sinus bradycardia with a rate of 47 T wave inversions in the inferior leads of 2, 3, aVF as well as lateral leads V4 through V6.  Normal intervals, normal axis abnormal appearing EKG. [DA]      ED Course User Index  [DA] Drew Guerra MD         CONSULTS:  None    PROCEDURES:     Procedures            (Please note that portions of this note were completed with a voice recognition program.  Efforts were made to edit the dictations but occasionally words are mis-transcribed.)    Christopher Salcido DO (electronically signed)  Emergency Attending Physician              Christopher Salcido DO  07/03/25 2053

## 2025-07-03 NOTE — ED TRIAGE NOTES
Triage note: Pt arrives from [facility] for elevated k+. Pt went for scartches on his leg and had bloodwork done which came back abnormal. Pt denies other complaints besides the scratches on legs.

## 2025-07-04 ENCOUNTER — APPOINTMENT (OUTPATIENT)
Facility: HOSPITAL | Age: 60
DRG: 310 | End: 2025-07-04
Payer: COMMERCIAL

## 2025-07-04 VITALS
TEMPERATURE: 97.4 F | BODY MASS INDEX: 26.24 KG/M2 | SYSTOLIC BLOOD PRESSURE: 137 MMHG | RESPIRATION RATE: 18 BRPM | OXYGEN SATURATION: 98 % | HEIGHT: 73 IN | HEART RATE: 53 BPM | WEIGHT: 198 LBS | DIASTOLIC BLOOD PRESSURE: 94 MMHG

## 2025-07-04 LAB
-: NORMAL
ALBUMIN SERPL-MCNC: 4.1 G/DL (ref 3.5–5)
ALBUMIN/GLOB SERPL: 0.9 (ref 1.1–2.2)
ALP SERPL-CCNC: 66 U/L (ref 45–117)
ALT SERPL-CCNC: 24 U/L (ref 12–78)
ANION GAP SERPL CALC-SCNC: 6 MMOL/L (ref 2–12)
AST SERPL-CCNC: 19 U/L (ref 15–37)
BILIRUB SERPL-MCNC: 0.9 MG/DL (ref 0.2–1)
BUN SERPL-MCNC: 12 MG/DL (ref 6–20)
BUN/CREAT SERPL: 8 (ref 12–20)
CALCIUM SERPL-MCNC: 9.7 MG/DL (ref 8.5–10.1)
CHLORIDE SERPL-SCNC: 108 MMOL/L (ref 97–108)
CO2 SERPL-SCNC: 26 MMOL/L (ref 21–32)
CREAT SERPL-MCNC: 1.5 MG/DL (ref 0.7–1.3)
ERYTHROCYTE [DISTWIDTH] IN BLOOD BY AUTOMATED COUNT: 11.4 % (ref 11.5–14.5)
FOLATE SERPL-MCNC: 13 NG/ML (ref 5–21)
GLOBULIN SER CALC-MCNC: 4.5 G/DL (ref 2–4)
GLUCOSE SERPL-MCNC: 95 MG/DL (ref 65–100)
HAV IGM SER QL: NONREACTIVE
HBV CORE IGM SER QL: NONREACTIVE
HBV SURFACE AG SER QL: <0.1 INDEX
HBV SURFACE AG SER QL: NEGATIVE
HCT VFR BLD AUTO: 36 % (ref 36.6–50.3)
HCV AB SER IA-ACNC: 0.07 INDEX
HCV AB SERPL QL IA: NONREACTIVE
HGB BLD-MCNC: 12.6 G/DL (ref 12.1–17)
INR PPP: 1 (ref 0.9–1.1)
MAGNESIUM SERPL-MCNC: 1.8 MG/DL (ref 1.6–2.4)
MCH RBC QN AUTO: 28.9 PG (ref 26–34)
MCHC RBC AUTO-ENTMCNC: 35 G/DL (ref 30–36.5)
MCV RBC AUTO: 82.6 FL (ref 80–99)
NRBC # BLD: 0 K/UL (ref 0–0.01)
NRBC BLD-RTO: 0 PER 100 WBC
PERIPHERAL SMEAR, MD REVIEW: NORMAL
PHOSPHATE SERPL-MCNC: 3.3 MG/DL (ref 2.6–4.7)
PLATELET # BLD AUTO: 150 K/UL (ref 150–400)
PMV BLD AUTO: 10.8 FL (ref 8.9–12.9)
POTASSIUM SERPL-SCNC: 3.9 MMOL/L (ref 3.5–5.1)
PROT SERPL-MCNC: 8.6 G/DL (ref 6.4–8.2)
PROTHROMBIN TIME: 10.9 SEC (ref 9.2–11.2)
RBC # BLD AUTO: 4.36 M/UL (ref 4.1–5.7)
SODIUM SERPL-SCNC: 140 MMOL/L (ref 136–145)
TSH SERPL DL<=0.05 MIU/L-ACNC: 1.72 UIU/ML (ref 0.36–3.74)
VIT B12 SERPL-MCNC: 199 PG/ML (ref 193–986)
WBC # BLD AUTO: 3.3 K/UL (ref 4.1–11.1)

## 2025-07-04 PROCEDURE — 82525 ASSAY OF COPPER: CPT

## 2025-07-04 PROCEDURE — 84100 ASSAY OF PHOSPHORUS: CPT

## 2025-07-04 PROCEDURE — 85027 COMPLETE CBC AUTOMATED: CPT

## 2025-07-04 PROCEDURE — 83735 ASSAY OF MAGNESIUM: CPT

## 2025-07-04 PROCEDURE — 82607 VITAMIN B-12: CPT

## 2025-07-04 PROCEDURE — 82746 ASSAY OF FOLIC ACID SERUM: CPT

## 2025-07-04 PROCEDURE — 76700 US EXAM ABDOM COMPLETE: CPT

## 2025-07-04 PROCEDURE — 80074 ACUTE HEPATITIS PANEL: CPT

## 2025-07-04 PROCEDURE — 6370000000 HC RX 637 (ALT 250 FOR IP): Performed by: HOSPITALIST

## 2025-07-04 PROCEDURE — 80053 COMPREHEN METABOLIC PANEL: CPT

## 2025-07-04 PROCEDURE — 2500000003 HC RX 250 WO HCPCS: Performed by: INTERNAL MEDICINE

## 2025-07-04 PROCEDURE — 84443 ASSAY THYROID STIM HORMONE: CPT

## 2025-07-04 PROCEDURE — 99222 1ST HOSP IP/OBS MODERATE 55: CPT | Performed by: SPECIALIST

## 2025-07-04 PROCEDURE — 85610 PROTHROMBIN TIME: CPT

## 2025-07-04 RX ORDER — VALSARTAN 320 MG/1
160 TABLET ORAL DAILY
Qty: 30 TABLET | Refills: 3 | Status: SHIPPED | OUTPATIENT
Start: 2025-07-04

## 2025-07-04 RX ORDER — AMLODIPINE BESYLATE 5 MG/1
5 TABLET ORAL DAILY
Status: DISCONTINUED | OUTPATIENT
Start: 2025-07-04 | End: 2025-07-04 | Stop reason: HOSPADM

## 2025-07-04 RX ORDER — VALSARTAN 160 MG/1
320 TABLET ORAL DAILY
Status: DISCONTINUED | OUTPATIENT
Start: 2025-07-04 | End: 2025-07-04 | Stop reason: HOSPADM

## 2025-07-04 RX ORDER — AMLODIPINE BESYLATE 10 MG/1
10 TABLET ORAL DAILY
Qty: 30 TABLET | Refills: 2 | Status: SHIPPED | OUTPATIENT
Start: 2025-07-04

## 2025-07-04 RX ADMIN — VALSARTAN 320 MG: 160 TABLET, FILM COATED ORAL at 09:53

## 2025-07-04 RX ADMIN — Medication 10 ML: at 09:53

## 2025-07-04 RX ADMIN — AMLODIPINE BESYLATE 5 MG: 5 TABLET ORAL at 09:53

## 2025-07-04 NOTE — DISCHARGE INSTRUCTIONS
Discharge Instructions       PATIENT ID: Jeferson Rosenthal III  MRN: [id number]   YOB: 1965    DATE OF ADMISSION: [unfilled]    DATE OF DISCHARGE: 7/4/2025    PRIMARY CARE PROVIDER: @PCP@     ATTENDING PHYSICIAN: [unfilled]  DISCHARGING PROVIDER: Vale Short MD    To contact this individual call [phone] and ask the  to page.   If unavailable ask to be transferred the Adult Hospitalist Department.    DISCHARGE DIAGNOSES leg rash , bradycardia     CONSULTATIONS: [unfilled]    PROCEDURES/SURGERIES: * No surgery found *    PENDING TEST RESULTS:   At the time of discharge the following test results are still pending: none     FOLLOW UP APPOINTMENTS:   [unfilled]     ADDITIONAL CARE RECOMMENDATIONS:   Topical lotions to both lower leg twice a day   Follow up cardiologist, you will need EP (electrophysiology) specialist from cardiology , Dr. Schroeder's office will arrange     DIET: regular diet      ACTIVITY: activity as tolerated        Radiology      DISCHARGE MEDICATIONS:   See Medication Reconciliation Form    It is important that you take the medication exactly as they are prescribed.   Keep your medication in the bottles provided by the pharmacist and keep a list of the medication names, dosages, and times to be taken in your wallet.   Do not take other medications without consulting your doctor.       NOTIFY YOUR PHYSICIAN FOR ANY OF THE FOLLOWING:   Fever over 101 degrees for 24 hours.   Chest pain, shortness of breath, fever, chills, nausea, vomiting, diarrhea, change in mentation, falling, weakness, bleeding. Severe pain or pain not relieved by medications.  Or, any other signs or symptoms that you may have questions about.      DISPOSITION:    Home With:   OT  PT  HH  RN       SNF/Inpatient Rehab/LTAC    Independent/assisted living    Hospice   x Other:     CDMP Checked:   Yes x     PROBLEM LIST Updated:  Yes x       Signed:   Vale Short MD  7/4/2025  2:16 PM

## 2025-07-04 NOTE — PROGRESS NOTES
Rapid Response Team Consult    7/4/25   at  1026    The Rapid Response Team was called to the bedside for nursing assistance.  Constant red alarms on the patient monitor. Heart rate consistently 35-40 bpm. Sinus Bradycardia.   Low alarm adjusted to 35 bpm. Nurse notified. CMU notified.

## 2025-07-04 NOTE — CONSULTS
[facility]  Precious Schroeder MD 2025   Cardiology Care Note                  []Initial visit     []Established visit     Patient Name: Jeferson Rosenthal III - :1965 - 59 y.o.   Rounding Cardiologist: Precious Schroeder MD,[facility]  Primary Cardiologist: Caio Padron NP  Reason for initial consult: bradycardia  Last EF by echo/MRI: 25    ECHO (TTE) COMPLETE (PRN CONTRAST/BUBBLE/STRAIN/3D) 2025  5:21 PM (Final)    Interpretation Summary    Left Ventricle: Normal left ventricular systolic function with a visually estimated EF of 55 - 60%. Left ventricle size is normal. Normal wall thickness. Normal wall motion.    Right Ventricle: Right ventricle size is normal. Normal systolic function.    Aortic Valve: Mild regurgitation.    Image quality is fair. Contrast used: Lumason.    Signed by: Precious Schroeder MD on 2025  5:21 PM     %   Jeferson Rosenthal III is a 59 y.o. male   Overnight, interval symptoms:  Comfortable   No complaints  Denies chest pain, heart palpitations , increasing edema, pre-syncope or shortness of breath at rest   No problems overnight, rhythm and hemodynamics stable -see vitals below    Assessment and Plan   1.  Bradycardia-patient has a chronic sinus bradycardia.  He has not had recent syncope but did have some dizziness previously.  Your previous cardiac event monitor 3/24/2025 with noted sinus bradycardia during sleep.    2.  History of MINOCA with cardiac cath showing open coronaries 2025.    3.  Preserved ejection fraction.  No overt CHF.  4.  Hypertension currently on Norvasc 5 and valsartan 320  5.  Petechial rash workup per primary team  6.  Hyperkalemia mild can reduce losartan 160 and  increase Norvasc to 10    Will arrange outpatient Holter and see back as needed this admit  Future Appointments   Date Time Provider Department Center   2025 11:20

## 2025-07-04 NOTE — PLAN OF CARE
Problem: Discharge Planning  Goal: Discharge to home or other facility with appropriate resources  7/4/2025 1117 by Lupe Heller RN  Outcome: Progressing  7/4/2025 0125 by Kan Ayala RN  Outcome: Progressing     Problem: Safety - Adult  Goal: Free from fall injury  7/4/2025 1117 by Lupe Heller RN  Outcome: Progressing  7/4/2025 0125 by Kan Ayala RN  Outcome: Progressing

## 2025-07-04 NOTE — DISCHARGE SUMMARY
medications.  Or, any other signs or symptoms that you may have questions about.    DISPOSITION:    Home With:   OT  PT  HH  RN       Long term SNF/Inpatient Rehab    Independent/assisted living    Hospice   x Other:       PATIENT CONDITION AT DISCHARGE:     Functional status    Poor     Deconditioned    x Independent      Cognition    x Lucid     Forgetful     Dementia      Catheters/lines (plus indication)    Brady     PICC     PEG    x None      Code status    x Full code     DNR        CHRONIC MEDICAL DIAGNOSES:      Greater than 31 minutes were spent with the patient on counseling and coordination of care    Signed:   Vale Short MD  7/4/2025  2:20 PM

## 2025-07-07 ENCOUNTER — TELEPHONE (OUTPATIENT)
Age: 60
End: 2025-07-07

## 2025-07-07 LAB — COPPER SERPL-MCNC: 91 UG/DL (ref 69–132)

## 2025-07-07 NOTE — TELEPHONE ENCOUNTER
Previous Messages       ----- Message -----  From: Precious Schroeder MD  Sent: 7/4/2025   2:28 PM EDT  To: Sonia Fitzpatrick    Arrange for 14 day Holter and non urgent EP consult, few months is fine  Future Appointments  7/14/2025  11:20 AM   Caio Padron APRN - NP     [facility] AMB    Future Appointments   Date Time Provider Department Center   7/7/2025  1:00 PM BS [facility] CARDIAC MONITOR [facility] AMB   7/14/2025 11:20 AM Caio Padron APRN - NP [facility] BS AMB   8/12/2025  2:00 PM Justus Sanchez MD [facility] AMB

## 2025-07-14 ENCOUNTER — OFFICE VISIT (OUTPATIENT)
Age: 60
End: 2025-07-14
Payer: COMMERCIAL

## 2025-07-14 VITALS
WEIGHT: 205 LBS | SYSTOLIC BLOOD PRESSURE: 136 MMHG | HEART RATE: 47 BPM | HEIGHT: 73 IN | BODY MASS INDEX: 27.17 KG/M2 | OXYGEN SATURATION: 96 % | DIASTOLIC BLOOD PRESSURE: 88 MMHG

## 2025-07-14 DIAGNOSIS — R00.1 BRADYCARDIA: Primary | ICD-10-CM

## 2025-07-14 DIAGNOSIS — N18.31 CKD STAGE 3A, GFR 45-59 ML/MIN (HCC): ICD-10-CM

## 2025-07-14 DIAGNOSIS — I42.8 NICM (NONISCHEMIC CARDIOMYOPATHY) (HCC): ICD-10-CM

## 2025-07-14 DIAGNOSIS — I10 PRIMARY HYPERTENSION: ICD-10-CM

## 2025-07-14 DIAGNOSIS — R79.89 ELEVATED TROPONIN LEVEL NOT DUE TO ACUTE CORONARY SYNDROME: ICD-10-CM

## 2025-07-14 DIAGNOSIS — I49.8 CHRONOTROPIC INCOMPETENCE WITH SINUS NODE DYSFUNCTION: ICD-10-CM

## 2025-07-14 PROCEDURE — 99214 OFFICE O/P EST MOD 30 MIN: CPT | Performed by: NURSE PRACTITIONER

## 2025-07-14 PROCEDURE — 3079F DIAST BP 80-89 MM HG: CPT | Performed by: NURSE PRACTITIONER

## 2025-07-14 PROCEDURE — 3075F SYST BP GE 130 - 139MM HG: CPT | Performed by: NURSE PRACTITIONER

## 2025-07-14 RX ORDER — SPIRONOLACTONE 50 MG/1
50 TABLET, FILM COATED ORAL DAILY
COMMUNITY
Start: 2025-06-06

## 2025-07-14 ASSESSMENT — PATIENT HEALTH QUESTIONNAIRE - PHQ9
2. FEELING DOWN, DEPRESSED OR HOPELESS: NOT AT ALL
SUM OF ALL RESPONSES TO PHQ QUESTIONS 1-9: 0
1. LITTLE INTEREST OR PLEASURE IN DOING THINGS: NOT AT ALL
SUM OF ALL RESPONSES TO PHQ QUESTIONS 1-9: 0

## 2025-07-14 NOTE — PROGRESS NOTES
1. Have you been to the ER, urgent care clinic since your last visit?  Hospitalized since your last visit? Patient was hospitalixed at [facility] for 24 hours due to Thrombocytopenia     2. Have you seen or consulted any other health care providers outside of the [facility] since your last visit?  Include any pap smears or colon screening. No    
diastolic function.  Mild LVH.  Normal wall motion.  -Mildly dilated aortic root.  No AR.    3/24: Echo  -LVEF 55 - 60%.   -Mild LVH. Normal wall motion.    4/25: Echo (non-specific CP, elevated trop)  LVEF 55 - 60%. Left ventricle size is normal. Normal wall thickness. Normal wall motion.  ·  Right Ventricle: Right ventricle size is normal. Normal systolic function.  ·  Aortic Valve: Mild regurgitation.    4/25: LHC (trop elevation to > 800, NSTEMI)  -Normal coronaries     Current Outpatient Medications   Medication Sig Dispense Refill    spironolactone (ALDACTONE) 50 MG tablet Take 1 tablet by mouth daily      amLODIPine (NORVASC) 10 MG tablet Take 1 tablet by mouth daily 30 tablet 2    valsartan (DIOVAN) 320 MG tablet Take 0.5 tablets by mouth daily 30 tablet 3    hydrOXYzine HCl (ATARAX) 25 MG tablet Take 1 tablet by mouth in the morning and at bedtime      EPINEPHrine (EPIPEN JR) 0.15 MG/0.3ML SOAJ TAKE 2 AUTOS AS NEEDED BY INJECTION ROUTE AS DIRECTED, FOR SEVERE ALLERGIC REACTION.      atorvastatin (LIPITOR) 20 MG tablet Take 1 tablet by mouth daily      allopurinol (ZYLOPRIM) 300 MG tablet Take 1 tablet by mouth once a week       No current facility-administered medications for this visit.     Allergies   Allergen Reactions    Sacubitril-Valsartan Angioedema     Lip swelling. Patient tolerates valsartan and is currently on at home         Past Medical History:   Diagnosis Date    Chronic alcohol use     Chronic sinus bradycardia     CKD stage 3a, GFR 45-59 ml/min ([facility])     HFrEF (heart failure with reduced ejection fraction) ([facility])     Hyperlipidemia     Hypertension     NICM (nonischemic cardiomyopathy) ([facility])      No family history on file.  Past Surgical History:   Procedure Laterality Date    CARDIAC PROCEDURE N/A 4/30/2025    Left heart cath / coronary angiography performed by Ruben Sullivan MD at [facility] CARDIAC CATH LAB    COLONOSCOPY N/A 10/15/2024    COLONOSCOPY performed by Lukas Macario MD at

## 2025-07-30 ENCOUNTER — TELEPHONE (OUTPATIENT)
Age: 60
End: 2025-07-30

## 2025-07-30 DIAGNOSIS — I10 PRIMARY HYPERTENSION: ICD-10-CM

## 2025-07-30 RX ORDER — AMLODIPINE BESYLATE 10 MG/1
10 TABLET ORAL DAILY
Qty: 90 TABLET | Refills: 1 | Status: SHIPPED | OUTPATIENT
Start: 2025-07-30

## 2025-08-25 ENCOUNTER — TELEPHONE (OUTPATIENT)
Age: 60
End: 2025-08-25

## 2025-08-25 DIAGNOSIS — I10 PRIMARY HYPERTENSION: Primary | ICD-10-CM

## 2025-08-25 RX ORDER — VALSARTAN 320 MG/1
160 TABLET ORAL DAILY
Qty: 30 TABLET | Refills: 2 | Status: SHIPPED | OUTPATIENT
Start: 2025-08-25

## (undated) DEVICE — SPLINT WR VELC FOAM NEUT POS DISP FOR RAD ART ACC SFT STRP

## (undated) DEVICE — ANGIOGRAPHY KIT

## (undated) DEVICE — CATHETER COR DIAG PIGTAILS PIG 145 CRV 5FR 110CM 6 SIDE H

## (undated) DEVICE — PADPRO DEFIBRILLATION/PACING/CARDIOVERSION/MONITORING ELECTRODES, ADULT/CHILD GREATER THAN 10 KG RADIOTRANSPARENT ELECTRODE, PHYSIO-CONTROL QUIK-COMBO (M) 60" (152 CM): Brand: PADPRO

## (undated) DEVICE — Device

## (undated) DEVICE — CANNULA CUSH AD W/ 14FT TBG

## (undated) DEVICE — GLIDESHEATH SLENDER ACCESS KIT: Brand: GLIDESHEATH SLENDER

## (undated) DEVICE — PERCLOSE PROGLIDE™ SUTURE-MEDIATED CLOSURE SYSTEM: Brand: PERCLOSE PROGLIDE™

## (undated) DEVICE — CATHETER ART THERMODILUTION 6 FRX110 CM 4 LUMEN SWAN

## (undated) DEVICE — PROVE COVER: Brand: UNBRANDED

## (undated) DEVICE — CATHETER DIAG 5FR L100CM LUMN ID0.047IN JL3.5 CRV 0 SIDE H

## (undated) DEVICE — ELECTRODE,RADIOTRANSLUCENT,FOAM,5PK: Brand: MEDLINE

## (undated) DEVICE — CO-SET DELIVERY SYSTEM FOR 123 ROOM TEMPATURE INJECTATE: Brand: CO-SET+

## (undated) DEVICE — KIT HND CTRL 3 W STPCOCK ROT END 54IN PREM HI PRSS TBNG AT

## (undated) DEVICE — KIT MED IMAG CNTRST AGNT W/ IOPAMIDOL REUSE

## (undated) DEVICE — CATHETER DIAG 5FR L100CM LUMN ID0.047IN JR4 CRV 0 SIDE H

## (undated) DEVICE — SPECIAL PROCEDURE DRAPE 32" X 34": Brand: SPECIAL PROCEDURE DRAPE

## (undated) DEVICE — KIT MFLD ISOLATN NACL CNTRST PRT TBNG SPIK W/ PRSS TRNSDUC

## (undated) DEVICE — KIT AT-X65 ANGIOTOUCH HAND CONTROLLER

## (undated) DEVICE — WASTE KIT - ST MARY: Brand: MEDLINE INDUSTRIES, INC.